# Patient Record
Sex: MALE | Race: WHITE | NOT HISPANIC OR LATINO | URBAN - METROPOLITAN AREA
[De-identification: names, ages, dates, MRNs, and addresses within clinical notes are randomized per-mention and may not be internally consistent; named-entity substitution may affect disease eponyms.]

---

## 2022-02-26 ENCOUNTER — APPOINTMENT (OUTPATIENT)
Dept: GENERAL RADIOLOGY | Facility: HOSPITAL | Age: 57
End: 2022-02-26

## 2022-02-26 ENCOUNTER — HOSPITAL ENCOUNTER (EMERGENCY)
Facility: HOSPITAL | Age: 57
Discharge: HOME | End: 2022-02-27
Attending: EMERGENCY MEDICINE | Admitting: EMERGENCY MEDICINE

## 2022-02-26 ENCOUNTER — APPOINTMENT (OUTPATIENT)
Dept: CT IMAGING | Facility: HOSPITAL | Age: 57
End: 2022-02-26

## 2022-02-26 DIAGNOSIS — Z86.39 HISTORY OF OBESITY: ICD-10-CM

## 2022-02-26 DIAGNOSIS — R07.89 ATYPICAL CHEST PAIN: Primary | ICD-10-CM

## 2022-02-26 DIAGNOSIS — Z86.79 HISTORY OF HYPERTENSION: ICD-10-CM

## 2022-02-26 DIAGNOSIS — Z86.39 HISTORY OF HYPERLIPIDEMIA: ICD-10-CM

## 2022-02-26 DIAGNOSIS — R06.02 SHORTNESS OF BREATH: ICD-10-CM

## 2022-02-26 LAB
ALBUMIN SERPL-MCNC: 4.6 G/DL (ref 3.5–5.2)
ALBUMIN/GLOB SERPL: 1.6 G/DL
ALP SERPL-CCNC: 75 U/L (ref 39–117)
ALT SERPL W P-5'-P-CCNC: 46 U/L (ref 1–41)
ANION GAP SERPL CALCULATED.3IONS-SCNC: 11 MMOL/L (ref 5–15)
AST SERPL-CCNC: 31 U/L (ref 1–40)
BASOPHILS # BLD AUTO: 0.07 10*3/MM3 (ref 0–0.2)
BASOPHILS NFR BLD AUTO: 0.8 % (ref 0–1.5)
BILIRUB SERPL-MCNC: 0.7 MG/DL (ref 0–1.2)
BUN SERPL-MCNC: 18 MG/DL (ref 6–20)
BUN/CREAT SERPL: 16.2 (ref 7–25)
CALCIUM SPEC-SCNC: 9.5 MG/DL (ref 8.6–10.5)
CHLORIDE SERPL-SCNC: 99 MMOL/L (ref 98–107)
CO2 SERPL-SCNC: 24 MMOL/L (ref 22–29)
CREAT SERPL-MCNC: 1.11 MG/DL (ref 0.76–1.27)
DEPRECATED RDW RBC AUTO: 37.7 FL (ref 37–54)
EOSINOPHIL # BLD AUTO: 0.28 10*3/MM3 (ref 0–0.4)
EOSINOPHIL NFR BLD AUTO: 3.1 % (ref 0.3–6.2)
ERYTHROCYTE [DISTWIDTH] IN BLOOD BY AUTOMATED COUNT: 11.8 % (ref 12.3–15.4)
GFR SERPL CREATININE-BSD FRML MDRD: 68 ML/MIN/1.73
GLOBULIN UR ELPH-MCNC: 2.8 GM/DL
GLUCOSE SERPL-MCNC: 109 MG/DL (ref 65–99)
HCT VFR BLD AUTO: 41.9 % (ref 37.5–51)
HGB BLD-MCNC: 14.2 G/DL (ref 13–17.7)
HOLD SPECIMEN: NORMAL
HOLD SPECIMEN: NORMAL
IMM GRANULOCYTES # BLD AUTO: 0.03 10*3/MM3 (ref 0–0.05)
IMM GRANULOCYTES NFR BLD AUTO: 0.3 % (ref 0–0.5)
LIPASE SERPL-CCNC: 47 U/L (ref 13–60)
LYMPHOCYTES # BLD AUTO: 2.41 10*3/MM3 (ref 0.7–3.1)
LYMPHOCYTES NFR BLD AUTO: 26.5 % (ref 19.6–45.3)
MCH RBC QN AUTO: 29.8 PG (ref 26.6–33)
MCHC RBC AUTO-ENTMCNC: 33.9 G/DL (ref 31.5–35.7)
MCV RBC AUTO: 87.8 FL (ref 79–97)
MONOCYTES # BLD AUTO: 0.61 10*3/MM3 (ref 0.1–0.9)
MONOCYTES NFR BLD AUTO: 6.7 % (ref 5–12)
NEUTROPHILS NFR BLD AUTO: 5.69 10*3/MM3 (ref 1.7–7)
NEUTROPHILS NFR BLD AUTO: 62.6 % (ref 42.7–76)
NRBC BLD AUTO-RTO: 0 /100 WBC (ref 0–0.2)
NT-PROBNP SERPL-MCNC: 5.4 PG/ML (ref 0–900)
PLATELET # BLD AUTO: 286 10*3/MM3 (ref 140–450)
PMV BLD AUTO: 10.6 FL (ref 6–12)
POTASSIUM SERPL-SCNC: 4.1 MMOL/L (ref 3.5–5.2)
PROT SERPL-MCNC: 7.4 G/DL (ref 6–8.5)
RBC # BLD AUTO: 4.77 10*6/MM3 (ref 4.14–5.8)
SODIUM SERPL-SCNC: 134 MMOL/L (ref 136–145)
TROPONIN T SERPL-MCNC: <0.01 NG/ML (ref 0–0.03)
TSH SERPL DL<=0.05 MIU/L-ACNC: 2.58 UIU/ML (ref 0.27–4.2)
WBC NRBC COR # BLD: 9.09 10*3/MM3 (ref 3.4–10.8)
WHOLE BLOOD HOLD SPECIMEN: NORMAL
WHOLE BLOOD HOLD SPECIMEN: NORMAL

## 2022-02-26 PROCEDURE — 70450 CT HEAD/BRAIN W/O DYE: CPT

## 2022-02-26 PROCEDURE — 81003 URINALYSIS AUTO W/O SCOPE: CPT | Performed by: PHYSICIAN ASSISTANT

## 2022-02-26 PROCEDURE — 84484 ASSAY OF TROPONIN QUANT: CPT | Performed by: EMERGENCY MEDICINE

## 2022-02-26 PROCEDURE — 83690 ASSAY OF LIPASE: CPT | Performed by: EMERGENCY MEDICINE

## 2022-02-26 PROCEDURE — 80050 GENERAL HEALTH PANEL: CPT | Performed by: EMERGENCY MEDICINE

## 2022-02-26 PROCEDURE — 83880 ASSAY OF NATRIURETIC PEPTIDE: CPT | Performed by: EMERGENCY MEDICINE

## 2022-02-26 PROCEDURE — 71045 X-RAY EXAM CHEST 1 VIEW: CPT

## 2022-02-26 PROCEDURE — 99283 EMERGENCY DEPT VISIT LOW MDM: CPT

## 2022-02-26 PROCEDURE — 93005 ELECTROCARDIOGRAM TRACING: CPT | Performed by: EMERGENCY MEDICINE

## 2022-02-26 RX ORDER — SODIUM CHLORIDE 0.9 % (FLUSH) 0.9 %
10 SYRINGE (ML) INJECTION AS NEEDED
Status: DISCONTINUED | OUTPATIENT
Start: 2022-02-26 | End: 2022-02-27 | Stop reason: HOSPADM

## 2022-02-26 RX ORDER — ASPIRIN 81 MG/1
324 TABLET, CHEWABLE ORAL ONCE
Status: DISCONTINUED | OUTPATIENT
Start: 2022-02-26 | End: 2022-02-27 | Stop reason: HOSPADM

## 2022-02-27 VITALS
DIASTOLIC BLOOD PRESSURE: 80 MMHG | WEIGHT: 250 LBS | HEIGHT: 68 IN | HEART RATE: 75 BPM | RESPIRATION RATE: 18 BRPM | SYSTOLIC BLOOD PRESSURE: 130 MMHG | TEMPERATURE: 97.8 F | OXYGEN SATURATION: 93 % | BODY MASS INDEX: 37.89 KG/M2

## 2022-02-27 LAB
BILIRUB UR QL STRIP: NEGATIVE
CLARITY UR: CLEAR
COLOR UR: YELLOW
GLUCOSE UR STRIP-MCNC: NEGATIVE MG/DL
HGB UR QL STRIP.AUTO: NEGATIVE
HOLD SPECIMEN: NORMAL
KETONES UR QL STRIP: NEGATIVE
LEUKOCYTE ESTERASE UR QL STRIP.AUTO: NEGATIVE
NITRITE UR QL STRIP: NEGATIVE
PH UR STRIP.AUTO: 6.5 [PH] (ref 5–8)
PROT UR QL STRIP: NEGATIVE
SP GR UR STRIP: <=1.005 (ref 1–1.03)
TROPONIN T SERPL-MCNC: <0.01 NG/ML (ref 0–0.03)
UROBILINOGEN UR QL STRIP: NORMAL

## 2022-03-03 LAB
QT INTERVAL: 356 MS
QT INTERVAL: 388 MS
QTC INTERVAL: 400 MS
QTC INTERVAL: 400 MS

## 2023-09-20 ENCOUNTER — TRANSCRIBE ORDERS (OUTPATIENT)
Dept: CARDIOLOGY | Facility: CLINIC | Age: 58
End: 2023-09-20

## 2023-09-20 DIAGNOSIS — R42 DIZZINESS: ICD-10-CM

## 2023-09-20 DIAGNOSIS — R06.02 SHORTNESS OF BREATH: ICD-10-CM

## 2023-09-20 DIAGNOSIS — I35.0 SEVERE AORTIC STENOSIS: Primary | ICD-10-CM

## 2023-09-20 DIAGNOSIS — R06.09 DOE (DYSPNEA ON EXERTION): ICD-10-CM

## 2023-12-11 ENCOUNTER — HOSPITAL ENCOUNTER (OUTPATIENT)
Dept: CARDIOLOGY | Facility: HOSPITAL | Age: 58
Discharge: HOME | End: 2023-12-11
Admitting: INTERNAL MEDICINE
Payer: COMMERCIAL

## 2023-12-11 VITALS
SYSTOLIC BLOOD PRESSURE: 111 MMHG | DIASTOLIC BLOOD PRESSURE: 67 MMHG | HEIGHT: 68 IN | HEART RATE: 79 BPM | RESPIRATION RATE: 14 BRPM | OXYGEN SATURATION: 93 % | BODY MASS INDEX: 37.89 KG/M2 | WEIGHT: 250 LBS

## 2023-12-11 DIAGNOSIS — I35.0 SEVERE AORTIC STENOSIS: ICD-10-CM

## 2023-12-11 DIAGNOSIS — R42 DIZZINESS: ICD-10-CM

## 2023-12-11 DIAGNOSIS — R06.02 SHORTNESS OF BREATH: ICD-10-CM

## 2023-12-11 DIAGNOSIS — R06.09 DOE (DYSPNEA ON EXERTION): ICD-10-CM

## 2023-12-11 LAB
AORTIC ROOT ANNULUS: 2.5 CM
ASCENDING AORTA: 3.1 CM
BH CV ECHO MEAS - AO MAX PG: 67 MMHG
BH CV ECHO MEAS - AO MEAN PG: 36.4 MMHG
BH CV ECHO MEAS - AO V2 MAX: 409.2 CM/SEC
BH CV ECHO MEAS - AO V2 VTI: 65.9 CM
BH CV ECHO MEAS - AVA PLANIMETRY TRACED: 0.9 CM2
BH CV ECHO MEAS - AVA(I,D): 1.21 CM2
BH CV ECHO MEAS - IVSD: 1.3 CM
BH CV ECHO MEAS - LV MAX PG: 6.4 MMHG
BH CV ECHO MEAS - LV MEAN PG: 4.4 MMHG
BH CV ECHO MEAS - LV V1 MAX: 127 CM/SEC
BH CV ECHO MEAS - LV V1 VTI: 21.6 CM
BH CV ECHO MEAS - LVOT AREA: 3.7 CM2
BH CV ECHO MEAS - LVOT DIAM: 2.17 CM
BH CV ECHO MEAS - SV(LVOT): 79.4 ML

## 2023-12-11 PROCEDURE — 93321 DOPPLER ECHO F-UP/LMTD STD: CPT | Performed by: INTERNAL MEDICINE

## 2023-12-11 PROCEDURE — 93312 ECHO TRANSESOPHAGEAL: CPT | Performed by: INTERNAL MEDICINE

## 2023-12-11 PROCEDURE — 93325 DOPPLER ECHO COLOR FLOW MAPG: CPT | Performed by: INTERNAL MEDICINE

## 2023-12-11 PROCEDURE — 99152 MOD SED SAME PHYS/QHP 5/>YRS: CPT

## 2023-12-11 PROCEDURE — 93321 DOPPLER ECHO F-UP/LMTD STD: CPT

## 2023-12-11 PROCEDURE — 25010000002 FENTANYL CITRATE (PF) 50 MCG/ML SOLUTION: Performed by: INTERNAL MEDICINE

## 2023-12-11 PROCEDURE — 99153 MOD SED SAME PHYS/QHP EA: CPT

## 2023-12-11 PROCEDURE — 93325 DOPPLER ECHO COLOR FLOW MAPG: CPT

## 2023-12-11 PROCEDURE — S0260 H&P FOR SURGERY: HCPCS

## 2023-12-11 PROCEDURE — 93312 ECHO TRANSESOPHAGEAL: CPT

## 2023-12-11 PROCEDURE — 25010000002 MIDAZOLAM PER 1 MG: Performed by: INTERNAL MEDICINE

## 2023-12-11 RX ORDER — MIDAZOLAM HYDROCHLORIDE 1 MG/ML
INJECTION INTRAMUSCULAR; INTRAVENOUS
Status: COMPLETED | OUTPATIENT
Start: 2023-12-11 | End: 2023-12-11

## 2023-12-11 RX ORDER — ATORVASTATIN CALCIUM 40 MG/1
40 TABLET, FILM COATED ORAL DAILY
COMMUNITY

## 2023-12-11 RX ORDER — LISINOPRIL 20 MG/1
20 TABLET ORAL DAILY
COMMUNITY

## 2023-12-11 RX ORDER — FENTANYL CITRATE 50 UG/ML
INJECTION, SOLUTION INTRAMUSCULAR; INTRAVENOUS
Status: COMPLETED | OUTPATIENT
Start: 2023-12-11 | End: 2023-12-11

## 2023-12-11 RX ORDER — ASPIRIN 81 MG/1
81 TABLET ORAL DAILY
COMMUNITY

## 2023-12-11 RX ADMIN — FENTANYL CITRATE 50 MCG: 50 INJECTION, SOLUTION INTRAMUSCULAR; INTRAVENOUS at 14:03

## 2023-12-11 RX ADMIN — MIDAZOLAM 2 MG: 1 INJECTION INTRAMUSCULAR; INTRAVENOUS at 14:03

## 2023-12-11 RX ADMIN — FENTANYL CITRATE 25 MCG: 50 INJECTION, SOLUTION INTRAMUSCULAR; INTRAVENOUS at 14:10

## 2023-12-11 RX ADMIN — MIDAZOLAM 2 MG: 1 INJECTION INTRAMUSCULAR; INTRAVENOUS at 14:07

## 2023-12-11 RX ADMIN — MIDAZOLAM 1 MG: 1 INJECTION INTRAMUSCULAR; INTRAVENOUS at 14:10

## 2023-12-11 RX ADMIN — MIDAZOLAM 1 MG: 1 INJECTION INTRAMUSCULAR; INTRAVENOUS at 14:29

## 2023-12-11 RX ADMIN — METHOHEXITAL SODIUM 20 MG: 500 INJECTION, POWDER, LYOPHILIZED, FOR SOLUTION INTRAMUSCULAR; INTRAVENOUS; RECTAL at 14:40

## 2023-12-11 RX ADMIN — FENTANYL CITRATE 25 MCG: 50 INJECTION, SOLUTION INTRAMUSCULAR; INTRAVENOUS at 14:25

## 2023-12-11 RX ADMIN — MIDAZOLAM 1 MG: 1 INJECTION INTRAMUSCULAR; INTRAVENOUS at 14:22

## 2023-12-11 RX ADMIN — FENTANYL CITRATE 50 MCG: 50 INJECTION, SOLUTION INTRAMUSCULAR; INTRAVENOUS at 14:07

## 2023-12-11 RX ADMIN — FENTANYL CITRATE 25 MCG: 50 INJECTION, SOLUTION INTRAMUSCULAR; INTRAVENOUS at 14:22

## 2023-12-11 RX ADMIN — METHOHEXITAL SODIUM 10 MG: 500 INJECTION, POWDER, LYOPHILIZED, FOR SOLUTION INTRAMUSCULAR; INTRAVENOUS; RECTAL at 14:36

## 2023-12-11 RX ADMIN — FENTANYL CITRATE 25 MCG: 50 INJECTION, SOLUTION INTRAMUSCULAR; INTRAVENOUS at 14:29

## 2023-12-11 RX ADMIN — MIDAZOLAM 1 MG: 1 INJECTION INTRAMUSCULAR; INTRAVENOUS at 14:25

## 2023-12-11 RX ADMIN — METHOHEXITAL SODIUM 10 MG: 500 INJECTION, POWDER, LYOPHILIZED, FOR SOLUTION INTRAMUSCULAR; INTRAVENOUS; RECTAL at 14:18

## 2023-12-11 RX ADMIN — FENTANYL CITRATE 25 MCG: 50 INJECTION, SOLUTION INTRAMUSCULAR; INTRAVENOUS at 14:34

## 2023-12-11 RX ADMIN — METHOHEXITAL SODIUM 20 MG: 500 INJECTION, POWDER, LYOPHILIZED, FOR SOLUTION INTRAMUSCULAR; INTRAVENOUS; RECTAL at 14:14

## 2023-12-11 RX ADMIN — MIDAZOLAM 1 MG: 1 INJECTION INTRAMUSCULAR; INTRAVENOUS at 14:34

## 2023-12-11 NOTE — H&P
"      [facility]   History and Physical           IDENTIFICATION: 58 y.o. male residing in [place], [place]      There are no active hospital problems to display for this patient.    Problem list:  Hypertension  Hyperlipidemia  Obesity  JHONNY, CPAP  Aortic stenosis  Systolic murmur  GONZALEZ             History of Present Illness: Freddy Schumacher is a 58 y.o. male with above pertinent medical history, who presents today for CK for further evaluation of aortic stenosis.  Patient reports that for the past year he has noticed a decrease in his activity tolerance, with exertional shortness of breath, and feeling like his \"head is swimming.\"  He denies any chest pain, pressure, tightness.  He was evaluated by his PCP, who noted a systolic murmur and he was referred to Dr. Schumacher for further evaluation.  TTE noted severe AS, with mean gradient of 36 mmHg and ELZBIETA of 1.2 cm2.  On exam, patient denies any current pain or shortness of breath.  He does have BLE pitting edema, which he was not aware of.  He denies any cardiac history aside from hypertension and hyperlipidemia, which has been managed by his PCP.  Denies any prior issues with sedation.  Patient has had no changes to his medical history or medications since he was last seen by Dr. Schumacher in September.    No Known Allergies    Prior to Admission medications    Not on File       No past medical history on file.    No past surgical history on file.    No family history on file.    Social History     Tobacco Use   Smoking Status Not on file   Smokeless Tobacco Not on file       Social History     Substance and Sexual Activity   Alcohol Use Not on file         Review of Systems:   Review of Systems   Cardiovascular:  Positive for dyspnea on exertion and leg swelling. Negative for chest pain and near-syncope.            Vital Sign Min/Max for last 24 hours  No data recorded   BP  Min: 137/88  Max: 137/88   Pulse  Min: 76  Max: 76 " "  Resp  Min: 12  Max: 12   SpO2  Min: 97 %  Max: 97 %   No data recorded    No intake or output data in the 24 hours ending 12/11/23 1400        Tele:  NSR    Vitals reviewed.   Constitutional:       General: Awake.   Pulmonary:      Effort: Pulmonary effort is normal.      Breath sounds: Normal breath sounds.   Cardiovascular:      PMI at left midclavicular line. Normal rate. Regular rhythm. Normal S1. Normal S2.       Murmurs: There is a grade 3/6 harsh midsystolic murmur at the URSB, radiating to the neck.   Pulses:     Intact distal pulses.   Edema:     Peripheral edema present.     Pretibial: bilateral pitting edema of the pretibial area.     Ankle: bilateral pitting edema of the ankle.     Feet: bilateral pitting edema of the feet.  Skin:     General: Skin is warm and dry.      Capillary Refill: Capillary refill takes less than 2 seconds.   Neurological:      Mental Status: Alert.              DATA REVIEW:    EKG (9/19/2023):    NSR, t-wave abnormality     ECHO (9/20/2023):   LVEF 60%  Aortic valve is trileaflet.  There is thickening and calcification of the aortic valve leaflets with severely reduced excursion.  Peak velocity across the aortic valve 3.9 m/s, consistent with peak gradient of 60 mmHg, mean gradient of 36 mmHg.  Calculated ELZBIETA area is 1.2 cm².  Severe aortic stenosis.      [unfilled]        No results found for: \"PHOS\", \"MG\"          No results found for: \"HGBA1C\"  No results found for: \"CHOL\", \"CHLPL\", \"TRIG\", \"HDL\", \"LDL\", \"LDLDIRECT\"         Severe aortic stenosis  Echo 9/20/2023: There is thickening and calcification of the aortic valve leaflets with severely reduced excursion.  Peak velocity across the aortic valve 3.9 m/s, consistent with peak gradient of 60 mmHg, mean gradient of 36 mmHg.  Calculated ELZBIETA area is 1.2 cm².  Severe aortic stenosis.               Proceed with transesophageal echocardiogram for further evaluation of aortic stenosis.  Procedure, risks, benefits have been " discussed and patient is agreeable to proceed.  Further recommendations to follow procedure.    LIZ Owen        Electronically signed by LIZ Owen, 12/11/23, 12:36 PM EST.

## 2024-03-28 ENCOUNTER — HOSPITAL ENCOUNTER (OUTPATIENT)
Facility: HOSPITAL | Age: 59
Setting detail: HOSPITAL OUTPATIENT SURGERY
Discharge: HOME | End: 2024-03-28
Attending: INTERNAL MEDICINE | Admitting: INTERNAL MEDICINE
Payer: COMMERCIAL

## 2024-03-28 VITALS
DIASTOLIC BLOOD PRESSURE: 79 MMHG | BODY MASS INDEX: 42.83 KG/M2 | OXYGEN SATURATION: 96 % | TEMPERATURE: 97 F | WEIGHT: 282.6 LBS | RESPIRATION RATE: 16 BRPM | HEART RATE: 72 BPM | HEIGHT: 68 IN | SYSTOLIC BLOOD PRESSURE: 114 MMHG

## 2024-03-28 DIAGNOSIS — R93.1 ABNORMAL ECHOCARDIOGRAM: Primary | ICD-10-CM

## 2024-03-28 LAB
ANION GAP SERPL CALCULATED.3IONS-SCNC: 13 MMOL/L (ref 5–15)
BUN BLDA-MCNC: 15 MG/DL (ref 8–26)
BUN SERPL-MCNC: 15 MG/DL (ref 8–23)
BUN/CREAT SERPL: 14.9 (ref 7–25)
CA-I BLDA-SCNC: 1.2 MMOL/L (ref 1.2–1.32)
CALCIUM SPEC-SCNC: 9.1 MG/DL (ref 8.6–10.5)
CHLORIDE BLDA-SCNC: 102 MMOL/L (ref 98–109)
CHLORIDE SERPL-SCNC: 101 MMOL/L (ref 98–107)
CO2 BLDA-SCNC: 26 MMOL/L (ref 24–29)
CO2 SERPL-SCNC: 25 MMOL/L (ref 22–29)
CREAT BLDA-MCNC: 1 MG/DL (ref 0.6–1.3)
CREAT SERPL-MCNC: 1.01 MG/DL (ref 0.76–1.27)
DEPRECATED RDW RBC AUTO: 39 FL (ref 37–54)
EGFRCR SERPLBLD CKD-EPI 2021: 85.1 ML/MIN/1.73
EGFRCR SERPLBLD CKD-EPI 2021: 86.2 ML/MIN/1.73
ERYTHROCYTE [DISTWIDTH] IN BLOOD BY AUTOMATED COUNT: 11.9 % (ref 12.3–15.4)
GLUCOSE BLDC GLUCOMTR-MCNC: 106 MG/DL (ref 70–130)
GLUCOSE SERPL-MCNC: 103 MG/DL (ref 65–99)
HCT VFR BLD AUTO: 41.6 % (ref 37.5–51)
HCT VFR BLDA CALC: 39 % (ref 38–51)
HGB BLD-MCNC: 13.9 G/DL (ref 13–17.7)
HGB BLDA-MCNC: 13.3 G/DL (ref 12–17)
MCH RBC QN AUTO: 30 PG (ref 26.6–33)
MCHC RBC AUTO-ENTMCNC: 33.4 G/DL (ref 31.5–35.7)
MCV RBC AUTO: 89.8 FL (ref 79–97)
PLATELET # BLD AUTO: 278 10*3/MM3 (ref 140–450)
PMV BLD AUTO: 10.7 FL (ref 6–12)
POTASSIUM BLDA-SCNC: 3.9 MMOL/L (ref 3.5–4.9)
POTASSIUM SERPL-SCNC: 4.1 MMOL/L (ref 3.5–5.2)
RBC # BLD AUTO: 4.63 10*6/MM3 (ref 4.14–5.8)
SODIUM BLD-SCNC: 141 MMOL/L (ref 138–146)
SODIUM SERPL-SCNC: 139 MMOL/L (ref 136–145)
WBC NRBC COR # BLD AUTO: 9.75 10*3/MM3 (ref 3.4–10.8)

## 2024-03-28 PROCEDURE — C1894 INTRO/SHEATH, NON-LASER: HCPCS | Performed by: INTERNAL MEDICINE

## 2024-03-28 PROCEDURE — 25010000002 HEPARIN (PORCINE) PER 1000 UNITS: Performed by: INTERNAL MEDICINE

## 2024-03-28 PROCEDURE — 93567 NJX CAR CTH SPRVLV AORTGRPHY: CPT | Performed by: INTERNAL MEDICINE

## 2024-03-28 PROCEDURE — 80048 BASIC METABOLIC PNL TOTAL CA: CPT | Performed by: INTERNAL MEDICINE

## 2024-03-28 PROCEDURE — 85027 COMPLETE CBC AUTOMATED: CPT | Performed by: INTERNAL MEDICINE

## 2024-03-28 PROCEDURE — 25010000002 NICARDIPINE 2.5 MG/ML SOLUTION: Performed by: INTERNAL MEDICINE

## 2024-03-28 PROCEDURE — C1769 GUIDE WIRE: HCPCS | Performed by: INTERNAL MEDICINE

## 2024-03-28 PROCEDURE — 25510000001 IOPAMIDOL PER 1 ML: Performed by: INTERNAL MEDICINE

## 2024-03-28 PROCEDURE — 25010000002 FENTANYL CITRATE (PF) 50 MCG/ML SOLUTION: Performed by: INTERNAL MEDICINE

## 2024-03-28 PROCEDURE — 93454 CORONARY ARTERY ANGIO S&I: CPT | Performed by: INTERNAL MEDICINE

## 2024-03-28 PROCEDURE — 25810000003 SODIUM CHLORIDE 0.9 % SOLUTION: Performed by: INTERNAL MEDICINE

## 2024-03-28 PROCEDURE — 25010000002 MIDAZOLAM PER 1 MG: Performed by: INTERNAL MEDICINE

## 2024-03-28 PROCEDURE — 25010000002 PHENYLEPHRINE 10 MG/ML SOLUTION: Performed by: INTERNAL MEDICINE

## 2024-03-28 PROCEDURE — 80047 BASIC METABLC PNL IONIZED CA: CPT

## 2024-03-28 PROCEDURE — 85014 HEMATOCRIT: CPT

## 2024-03-28 RX ORDER — ALPRAZOLAM 0.25 MG/1
0.25 TABLET ORAL 3 TIMES DAILY PRN
Status: DISCONTINUED | OUTPATIENT
Start: 2024-03-28 | End: 2024-03-28 | Stop reason: HOSPADM

## 2024-03-28 RX ORDER — HEPARIN SODIUM 1000 [USP'U]/ML
INJECTION, SOLUTION INTRAVENOUS; SUBCUTANEOUS
Status: DISCONTINUED | OUTPATIENT
Start: 2024-03-28 | End: 2024-03-28 | Stop reason: HOSPADM

## 2024-03-28 RX ORDER — NALOXONE HCL 0.4 MG/ML
0.4 VIAL (ML) INJECTION
Status: DISCONTINUED | OUTPATIENT
Start: 2024-03-28 | End: 2024-03-28 | Stop reason: HOSPADM

## 2024-03-28 RX ORDER — HYDROCODONE BITARTRATE AND ACETAMINOPHEN 5; 325 MG/1; MG/1
1 TABLET ORAL EVERY 4 HOURS PRN
Status: DISCONTINUED | OUTPATIENT
Start: 2024-03-28 | End: 2024-03-28 | Stop reason: HOSPADM

## 2024-03-28 RX ORDER — FENTANYL CITRATE 50 UG/ML
INJECTION, SOLUTION INTRAMUSCULAR; INTRAVENOUS
Status: DISCONTINUED | OUTPATIENT
Start: 2024-03-28 | End: 2024-03-28 | Stop reason: HOSPADM

## 2024-03-28 RX ORDER — SODIUM CHLORIDE 9 MG/ML
250 INJECTION, SOLUTION INTRAVENOUS CONTINUOUS
Status: ACTIVE | OUTPATIENT
Start: 2024-03-28 | End: 2024-03-28

## 2024-03-28 RX ORDER — PHENYLEPHRINE HYDROCHLORIDE 10 MG/ML
INJECTION INTRAVENOUS
Status: DISCONTINUED | OUTPATIENT
Start: 2024-03-28 | End: 2024-03-28 | Stop reason: HOSPADM

## 2024-03-28 RX ORDER — TEMAZEPAM 7.5 MG/1
7.5 CAPSULE ORAL NIGHTLY PRN
Status: DISCONTINUED | OUTPATIENT
Start: 2024-03-28 | End: 2024-03-28 | Stop reason: HOSPADM

## 2024-03-28 RX ORDER — MORPHINE SULFATE 2 MG/ML
1 INJECTION, SOLUTION INTRAMUSCULAR; INTRAVENOUS EVERY 4 HOURS PRN
Status: DISCONTINUED | OUTPATIENT
Start: 2024-03-28 | End: 2024-03-28 | Stop reason: HOSPADM

## 2024-03-28 RX ORDER — FLUTICASONE PROPIONATE 50 MCG
2 SPRAY, SUSPENSION (ML) NASAL DAILY
COMMUNITY

## 2024-03-28 RX ORDER — MIDAZOLAM HYDROCHLORIDE 1 MG/ML
INJECTION INTRAMUSCULAR; INTRAVENOUS
Status: DISCONTINUED | OUTPATIENT
Start: 2024-03-28 | End: 2024-03-28 | Stop reason: HOSPADM

## 2024-03-28 RX ORDER — LIDOCAINE HYDROCHLORIDE 10 MG/ML
INJECTION, SOLUTION EPIDURAL; INFILTRATION; INTRACAUDAL; PERINEURAL
Status: DISCONTINUED | OUTPATIENT
Start: 2024-03-28 | End: 2024-03-28 | Stop reason: HOSPADM

## 2024-03-28 RX ORDER — ACETAMINOPHEN 325 MG/1
650 TABLET ORAL EVERY 4 HOURS PRN
Status: DISCONTINUED | OUTPATIENT
Start: 2024-03-28 | End: 2024-03-28 | Stop reason: HOSPADM

## 2024-03-28 RX ORDER — NICARDIPINE HCL-0.9% SOD CHLOR 1 MG/10 ML
SYRINGE (ML) INTRAVENOUS
Status: DISCONTINUED | OUTPATIENT
Start: 2024-03-28 | End: 2024-03-28 | Stop reason: HOSPADM

## 2024-03-28 NOTE — H&P
" [facility] - History & Physical     Freddy Schumacher  1965  2504/1      03/28/24      Identification:  Freddy Schumacher is a 59 y.o. male.      PCP:  Jamal Turcios MD        Chief Complaint: Valvular heart disease/aortic stenosis    Allergies:  has No Known Allergies.    Medications Prior to Admission   Medication Sig Dispense Refill Last Dose    aspirin 81 MG EC tablet Take 1 tablet by mouth Daily.   3/27/2024    atorvastatin (LIPITOR) 40 MG tablet Take 1 tablet by mouth Daily.   3/27/2024    fluticasone (FLONASE) 50 MCG/ACT nasal spray 2 sprays into the nostril(s) as directed by provider Daily.   3/27/2024    lisinopril (PRINIVIL,ZESTRIL) 20 MG tablet Take 1 tablet by mouth Daily.   3/27/2024       No current facility-administered medications for this encounter.        HPI:  Freddy Schumacher is a 59-year-old  CM with PMH HTN, HLP, obesity, JHONNY/CPAP use who has known valvular heart disease with severe aortic stenosis.  He recently underwent echocardiography to further evaluate his valve and was in office for follow-up.  He continues to complain of SOB and lightheadedness particularly with exertion.  He denies angina, denies presyncope or syncopal episodes.  He presents today to undergo elective cardiac catheterization as part of his workup for his severe AS.          Social History     Socioeconomic History    Marital status:    Tobacco Use    Smoking status: Never   Substance and Sexual Activity    Alcohol use: Never    Drug use: Never    Sexual activity: Defer     History reviewed. No pertinent family history.  Past Surgical History:   Procedure Laterality Date    CARDIAC CATHETERIZATION      038/28/2024 per dr. becerra    KNEE MENISCAL REPAIR Right        Review of Systems:  Pertinent positives are listed above and in physical exam.  All others have been reviewed and are negative.     Objective:   Vitals:   height is 172.7 cm (68\") and weight is 128 kg (282 lb 9.6 oz). His " temporal temperature is 97 °F (36.1 °C). His blood pressure is 117/84 and his pulse is 74. His respiration is 18 and oxygen saturation is 97%.  No intake or output data in the 24 hours ending 03/28/24 0743      Physical Exam:  General Appearance:    Alert, cooperative, in no acute distress   Head:    Normocephalic, without obvious abnormality, atraumatic   Eyes:            Lids and lashes normal, conjunctivae and sclerae normal, no   icterus, no pallor, corneas clear, PERRLA. + Glasses   Ears:    Ears appear intact with no abnormalities noted   Throat:   No oral lesions, no thrush, oral mucosa moist   Neck:   No adenopathy, supple, trachea midline, no thyromegaly, no carotid bruit, no JVD   Back:     No kyphosis present, no scoliosis present, no skin lesions,   erythema or scars, no tenderness to percussion or                  palpation,  range of motion normal   Lungs:     Clear to auscultation,respirations regular, even and               unlabored    Heart:    Regular rhythm and normal rate, normal S1 and S2, no         murmur, no gallop, no rub, no click   Abdomen:     Normal bowel sounds, no masses, no organomegaly, soft     nontender, nondistended, no guarding, no rebound               tenderness.  Obese   Extremities:   Moves all extremities well,  no cyanosis, no redness, no edema   Pulses:   Pulses palpable and equal bilaterally   Skin:   No bleeding, bruising or rash   Lymph nodes:   No palpable adenopathy   Neurologic:   Cranial nerves 2 - 12 grossly intact, sensation intact, DTR    present and equal bilaterally          Results Review:  I personally reviewed the patient's clinical results.    Results from last 7 days   Lab Units 03/28/24  0648 03/28/24  0637   WBC 10*3/mm3  --  9.75   HEMOGLOBIN g/dL  --  13.9   HEMOGLOBIN, POC g/dL 13.3  --    HEMATOCRIT %  --  41.6   HEMATOCRIT POC % 39  --    PLATELETS 10*3/mm3  --  278     Results from last 7 days   Lab Units 03/28/24  0648 03/28/24  0637   SODIUM  mmol/L  --  139   POTASSIUM mmol/L  --  4.1   CHLORIDE mmol/L  --  101   CO2 mmol/L  --  25.0   BUN mg/dL  --  15   CREATININE mg/dL 1.00 1.01   CALCIUM mg/dL  --  9.1   GLUCOSE mg/dL  --  103*     Results from last 7 days   Lab Units 03/28/24  0648 03/28/24  0637   SODIUM mmol/L  --  139   POTASSIUM mmol/L  --  4.1   CHLORIDE mmol/L  --  101   CO2 mmol/L  --  25.0   BUN mg/dL  --  15   CREATININE mg/dL 1.00 1.01   GLUCOSE mg/dL  --  103*   CALCIUM mg/dL  --  9.1                       Radiology:  Imaging Results (Last 72 Hours)       ** No results found for the last 72 hours. **            Tele: NSR    Assessment:  -59-year-old CM with VHDz /severe AS by CK December 2023 , symptomatic with exertional dyspnea and lightheadedness.  -HTN  -HLP  -JHONNY/CPAP compliant  -Obesity        Plan:  -Presents today to undergo elective cardiac catheterization as part of TAVR workup.  Risk and benefits have been reviewed with the patient and his wife and he is willing to proceed.  Further recommendations based on results.  -Continue current home medications.  -Continue CPAP use  - weight loss    I discussed the patient's findings and my recommendations with the patient, any present family members, and the nursing staff.  Donny Schumacher MD saw and examined patient, verified hx and PE, read all radiographic studies, reviewed labs and micro data, and formulated dx, plan for treatment and all medical decision making.      Naida Jensen PA-C for Donny Schumacher MD  03/28/24 07:43 EDT

## 2024-03-29 ENCOUNTER — DOCUMENTATION (OUTPATIENT)
Dept: CARDIOLOGY | Facility: HOSPITAL | Age: 59
End: 2024-03-29
Payer: COMMERCIAL

## 2024-03-29 NOTE — PROGRESS NOTES
Referral received from Dr. Schumacher for severe aortic stenosis. We discussed AS, SAVR vs. TAVR, pre-procedural testing requirements, hospitalization and recovery expectations. He will need TAVR CT and an apt with Dr. Joshi, we will arrange these apts and call him with the information. Educational folder mailed today, my contact information is included.     Will follow up after CT and CT surgery consult.

## 2024-04-02 DIAGNOSIS — I35.0 SEVERE AORTIC STENOSIS: Primary | ICD-10-CM

## 2024-04-05 ENCOUNTER — HOSPITAL ENCOUNTER (OUTPATIENT)
Dept: CT IMAGING | Facility: HOSPITAL | Age: 59
Discharge: HOME | End: 2024-04-05
Payer: COMMERCIAL

## 2024-04-05 VITALS
WEIGHT: 275 LBS | SYSTOLIC BLOOD PRESSURE: 103 MMHG | HEIGHT: 68 IN | BODY MASS INDEX: 41.68 KG/M2 | RESPIRATION RATE: 18 BRPM | TEMPERATURE: 97.7 F | DIASTOLIC BLOOD PRESSURE: 64 MMHG | HEART RATE: 75 BPM | OXYGEN SATURATION: 94 %

## 2024-04-05 DIAGNOSIS — I35.0 SEVERE AORTIC STENOSIS: ICD-10-CM

## 2024-04-05 PROCEDURE — 74174 CTA ABD&PLVS W/CONTRAST: CPT

## 2024-04-05 PROCEDURE — 25510000001 IOPAMIDOL PER 1 ML: Performed by: INTERNAL MEDICINE

## 2024-04-05 PROCEDURE — 71275 CT ANGIOGRAPHY CHEST: CPT

## 2024-04-05 RX ORDER — CETIRIZINE HYDROCHLORIDE 10 MG/1
10 TABLET ORAL DAILY
COMMUNITY

## 2024-04-05 RX ORDER — SODIUM CHLORIDE 0.9 % (FLUSH) 0.9 %
10 SYRINGE (ML) INJECTION AS NEEDED
Status: DISCONTINUED | OUTPATIENT
Start: 2024-04-05 | End: 2024-04-06 | Stop reason: HOSPADM

## 2024-04-05 RX ORDER — SODIUM CHLORIDE 9 MG/ML
40 INJECTION, SOLUTION INTRAVENOUS AS NEEDED
Status: DISCONTINUED | OUTPATIENT
Start: 2024-04-05 | End: 2024-04-06 | Stop reason: HOSPADM

## 2024-04-05 RX ORDER — SODIUM CHLORIDE 0.9 % (FLUSH) 0.9 %
10 SYRINGE (ML) INJECTION EVERY 12 HOURS SCHEDULED
Status: DISCONTINUED | OUTPATIENT
Start: 2024-04-05 | End: 2024-04-06 | Stop reason: HOSPADM

## 2024-04-05 RX ORDER — METOPROLOL TARTRATE 50 MG/1
50 TABLET, FILM COATED ORAL ONCE AS NEEDED
Status: DISCONTINUED | OUTPATIENT
Start: 2024-04-05 | End: 2024-04-06 | Stop reason: HOSPADM

## 2024-04-05 RX ORDER — METOPROLOL TARTRATE 100 MG/1
100 TABLET ORAL ONCE AS NEEDED
Status: DISCONTINUED | OUTPATIENT
Start: 2024-04-05 | End: 2024-04-06 | Stop reason: HOSPADM

## 2024-04-05 RX ADMIN — IOPAMIDOL 100 ML: 755 INJECTION, SOLUTION INTRAVENOUS at 14:00

## 2024-04-08 NOTE — PROGRESS NOTES
All pre-TAVR testing complete and consult with Dr. Joshi is scheduled for 5/20. If imaging is satisfactory and all are in agreement, will schedule TAVR. Date TBD.

## 2024-05-20 ENCOUNTER — OFFICE VISIT (OUTPATIENT)
Dept: CARDIAC SURGERY | Facility: CLINIC | Age: 59
End: 2024-05-20
Payer: COMMERCIAL

## 2024-05-20 ENCOUNTER — DOCUMENTATION (OUTPATIENT)
Dept: CARDIOLOGY | Facility: HOSPITAL | Age: 59
End: 2024-05-20
Payer: COMMERCIAL

## 2024-05-20 VITALS
WEIGHT: 282.6 LBS | DIASTOLIC BLOOD PRESSURE: 78 MMHG | TEMPERATURE: 98.6 F | OXYGEN SATURATION: 98 % | SYSTOLIC BLOOD PRESSURE: 138 MMHG | HEART RATE: 89 BPM | HEIGHT: 68 IN | BODY MASS INDEX: 42.83 KG/M2

## 2024-05-20 DIAGNOSIS — I35.9 AORTIC VALVE DISORDER: Primary | ICD-10-CM

## 2024-05-20 PROCEDURE — 99204 OFFICE O/P NEW MOD 45 MIN: CPT | Performed by: THORACIC SURGERY (CARDIOTHORACIC VASCULAR SURGERY)

## 2024-05-20 NOTE — PROGRESS NOTES
Met with patient and spouse after apt with Dr. Joshi. He reports worsening GONZALEZ and fatigue. Anticipating TAVR to be able to do outdoor activities this summer like kayaking and hiking. We discussed aortic stenosis, SAVR vs. TAVR, hospitalization and recovery expectations. Goals of care were discussed with the patient. A shared decision making approach was used as we discussed the patient's treatment options for severe symptomatic aortic stenosis.  Treatment options include TAVR, SAVR and medical management.  Risks and benefits of transcatheter aortic valve replacement (TAVR) versus surgical aortic valve replacement (SAVR) were discussed. Risks include, but are not limited to, death, stroke, bleeding, vascular injury, heart rhythm disturbance (including possible need for permanent pacemaker), infection, heart attack, kidney failure and other organ failure.     Will follow up after TAVR. CT surgery will call him when TAVR is scheduled.     NYHA II  KCCQ 36/70  5MWT 6.21s/5 meters

## 2024-05-20 NOTE — PROGRESS NOTES
05/20/2024  Patient Information  Freddy Schumacher                                                                                          [address] [place] [id number]   1965  'PCP/Referring Physician'  Jamal Turcios MD  [phone]  Alejandrina Jackman, APRN  [phone]  Chief Complaint   Patient presents with    Consult     N/p per Alejandrina Jackman for TAVR consult. Pt states that he is SOB with exertion like when walking fast other than that he feels fine.        History of Present Illness:  The patient is a 59-year-old male who has been referred for evaluation of SAVR versus TAVR.  He has apparently met with Dr. Schumacher and discussed this and has chosen a TAVR.  He has been getting very short of breath with activity particularly going up inclines or stairs.  He is here for evaluation. He does have obstructive sleep apnea and is on a CPAP.      There is no problem list on file for this patient.    Past Medical History:   Diagnosis Date    Aortic stenosis     Heart murmur     Hyperlipidemia     Hypertension     JHONNY on CPAP      Past Surgical History:   Procedure Laterality Date    CARDIAC CATHETERIZATION      038/28/2024 per dr. schumacher    CARDIAC CATHETERIZATION N/A 3/28/2024    Procedure: Left Heart Cath;  Surgeon: Donny Schumacher MD;  Location: [facility] CATH INVASIVE LOCATION;  Service: Cardiology;  Laterality: N/A;    KNEE MENISCAL REPAIR Right        Current Outpatient Medications:     aspirin 81 MG EC tablet, Take 1 tablet by mouth Daily., Disp: , Rfl:     atorvastatin (LIPITOR) 40 MG tablet, Take 1 tablet by mouth Daily., Disp: , Rfl:     cetirizine (zyrTEC) 10 MG tablet, Take 1 tablet by mouth Daily., Disp: , Rfl:     fluticasone (FLONASE) 50 MCG/ACT nasal spray, 2 sprays into the nostril(s) as directed by provider Daily., Disp: , Rfl:     lisinopril (PRINIVIL,ZESTRIL) 20 MG tablet, Take 1 tablet by mouth Daily., Disp: , Rfl:   No Known Allergies  Social History     Socioeconomic  "History    Marital status:     Number of children: 3   Tobacco Use    Smoking status: Never   Vaping Use    Vaping status: Never Used   Substance and Sexual Activity    Alcohol use: Never    Drug use: Never    Sexual activity: Defer     Family History   Problem Relation Age of Onset    Diabetes Mother     Coronary artery disease Father      Review of Systems   Constitutional: Negative for chills, decreased appetite, fever, malaise/fatigue, weight gain and weight loss.   HENT:  Negative for hearing loss.    Cardiovascular:  Positive for dyspnea on exertion (with great exertion). Negative for chest pain, claudication, cyanosis, irregular heartbeat, leg swelling, near-syncope, orthopnea, palpitations, paroxysmal nocturnal dyspnea and syncope.   Endocrine: Negative for polydipsia, polyphagia and polyuria.   Hematologic/Lymphatic: Negative for adenopathy. Does not bruise/bleed easily.   Musculoskeletal:  Negative for arthritis, falls, gout, joint pain, joint swelling, muscle weakness and myalgias.   Gastrointestinal:  Negative for abdominal pain, anorexia, dysphagia, heartburn, nausea and vomiting.   Genitourinary:  Negative for dysuria and hematuria.   Neurological:  Positive for numbness (in the fingers of both hands). Negative for dizziness, focal weakness, headaches, loss of balance, seizures, vertigo and weakness.   Psychiatric/Behavioral:  Negative for altered mental status, depression, substance abuse and suicidal ideas. The patient is not nervous/anxious.    Allergic/Immunologic: Positive for environmental allergies. Negative for HIV exposure and persistent infections.       Vitals:    05/20/24 0844   BP: 138/78   Pulse: 89   Temp: 98.6 °F (37 °C)   SpO2: 98%   Weight: 128 kg (282 lb 9.6 oz)   Height: 172.7 cm (68\")      Physical Exam  Vitals and nursing note reviewed.   Constitutional:       General: He is not in acute distress.     Appearance: He is well-developed.   HENT:      Head: Normocephalic. "   Eyes:      Conjunctiva/sclera: Conjunctivae normal.      Pupils: Pupils are equal, round, and reactive to light.   Neck:      Thyroid: No thyroid mass or thyromegaly.   Cardiovascular:      Rate and Rhythm: Normal rate.      Heart sounds: Murmur heard.      No friction rub. No gallop.   Pulmonary:      Breath sounds: No wheezing, rhonchi or rales.   Abdominal:      General: Bowel sounds are normal. There is no distension.      Palpations: Abdomen is soft. There is no mass.      Tenderness: There is no abdominal tenderness.   Musculoskeletal:         General: No deformity. Normal range of motion.      Cervical back: Normal range of motion.   Skin:     Findings: No petechiae.      Nails: There is no clubbing.   Neurological:      Mental Status: He is oriented to person, place, and time.      Cranial Nerves: No cranial nerve deficit.      Sensory: No sensory deficit.   Psychiatric:         Behavior: Behavior normal.         The ROS, past medical history, surgical history, family history, social history, and vitals were reviewed by myself and corrected as needed.      Labs/Imaging:  I have obtained and reviewed the medical records from Ms. Jackman including the echocardiogram demonstrating severe aortic stenosis.     Assessment/Plan:  The patient is a 59-year-old male who presents at this time with severe aortic stenosis with a mean gradient of 36 V-max of 409.  He is very symptomatic with shortness of breath with minimal activities particularly going up stairs.  I discussed with him SAVR versus TAVR and the advantages and cons of each approach.  He is certainly leaning very much toward TAVR.  He does not want to go through the surgery aspect associated with this TAVR.  He is aware of the disadvantages with TAVR as well.  He still has chosen TAVR.  I discussed the operation, risks, and alternatives.  I discussed the risks, which includes, risk to his life, bleeding, infection and organ failure, as well as other risk  factors.  He appears to be fully informed and desires to proceed. I would like to thank you for this consultation.    There is no problem list on file for this patient.    CC: MD Alejandrina Perez APRN Regina Fugate editing for Surjit Joshi MD       I, Surjit Joshi MD, have read and agree with the editing done by Nicki Angel, .

## 2024-05-24 ENCOUNTER — PATIENT ROUNDING (BHMG ONLY) (OUTPATIENT)
Dept: CARDIAC SURGERY | Facility: CLINIC | Age: 59
End: 2024-05-24
Payer: COMMERCIAL

## 2024-05-24 DIAGNOSIS — I35.9 AORTIC VALVE DISORDER: Primary | ICD-10-CM

## 2024-05-24 NOTE — PROGRESS NOTES
May 24, 2024    Hello, may I speak with Freddy Schumacher?    My name is Amada    I am  with [facility] CARDIOTHORACIC SURGERY  [address]  [facility] [address] [phone].    Before we get started may I verify your date of birth? 1965    I am calling to officially welcome you to our practice and ask about your recent visit. Is this a good time to talk? YES    Tell me about your visit with us. What things went well?  YES       We're always looking for ways to make our patients' experiences even better. Do you have recommendations on ways we may improve?  NO    Overall were you satisfied with your first visit to our practice? YES VERY SATISFIED       I appreciate you taking the time to speak with me today. Is there anything else I can do for you? NO      Thank you, and have a great day.

## 2024-06-06 ENCOUNTER — PREP FOR SURGERY (OUTPATIENT)
Dept: OTHER | Facility: HOSPITAL | Age: 59
End: 2024-06-06
Payer: COMMERCIAL

## 2024-06-06 DIAGNOSIS — I35.9 AORTIC VALVE DISORDER: Primary | ICD-10-CM

## 2024-06-07 RX ORDER — CHLORHEXIDINE GLUCONATE 500 MG/1
CLOTH TOPICAL EVERY 12 HOURS PRN
OUTPATIENT
Start: 2024-06-07

## 2024-06-07 RX ORDER — CHLORHEXIDINE GLUCONATE 500 MG/1
1 CLOTH TOPICAL EVERY 12 HOURS PRN
OUTPATIENT
Start: 2024-06-07

## 2024-06-07 RX ORDER — ASPIRIN 325 MG
325 TABLET ORAL NIGHTLY
OUTPATIENT
Start: 2024-06-07 | End: 2024-06-08

## 2024-06-07 RX ORDER — NITROGLYCERIN 0.4 MG/1
0.4 TABLET SUBLINGUAL
OUTPATIENT
Start: 2024-06-07

## 2024-06-07 RX ORDER — CHLORHEXIDINE GLUCONATE ORAL RINSE 1.2 MG/ML
15 SOLUTION DENTAL ONCE
OUTPATIENT
Start: 2024-06-07 | End: 2024-06-07

## 2024-06-19 ENCOUNTER — HOSPITAL ENCOUNTER (OUTPATIENT)
Dept: GENERAL RADIOLOGY | Facility: HOSPITAL | Age: 59
Discharge: HOME | End: 2024-06-19
Payer: COMMERCIAL

## 2024-06-19 ENCOUNTER — PRE-ADMISSION TESTING (OUTPATIENT)
Dept: PREADMISSION TESTING | Facility: HOSPITAL | Age: 59
End: 2024-06-19
Payer: COMMERCIAL

## 2024-06-19 ENCOUNTER — HOSPITAL ENCOUNTER (OUTPATIENT)
Dept: PULMONOLOGY | Facility: HOSPITAL | Age: 59
Discharge: HOME | End: 2024-06-19
Payer: COMMERCIAL

## 2024-06-19 VITALS — WEIGHT: 279.54 LBS | BODY MASS INDEX: 42.37 KG/M2 | HEIGHT: 68 IN | OXYGEN SATURATION: 96 %

## 2024-06-19 DIAGNOSIS — Z01.89 LABORATORY TEST: Primary | ICD-10-CM

## 2024-06-19 DIAGNOSIS — I35.9 AORTIC VALVE DISORDER: ICD-10-CM

## 2024-06-19 LAB
ABO GROUP BLD: NORMAL
ALBUMIN SERPL-MCNC: 4 G/DL (ref 3.5–5.2)
ALBUMIN/GLOB SERPL: 1.3 G/DL
ALP SERPL-CCNC: 85 U/L (ref 39–117)
ALT SERPL W P-5'-P-CCNC: 49 U/L (ref 1–41)
AMPHET+METHAMPHET UR QL: NEGATIVE
AMPHETAMINES UR QL: NEGATIVE
ANION GAP SERPL CALCULATED.3IONS-SCNC: 11 MMOL/L (ref 5–15)
APTT PPP: 29.3 SECONDS (ref 22–39)
AST SERPL-CCNC: 36 U/L (ref 1–40)
BARBITURATES UR QL SCN: NEGATIVE
BASOPHILS # BLD AUTO: 0.07 10*3/MM3 (ref 0–0.2)
BASOPHILS NFR BLD AUTO: 0.8 % (ref 0–1.5)
BENZODIAZ UR QL SCN: NEGATIVE
BILIRUB SERPL-MCNC: 0.7 MG/DL (ref 0–1.2)
BUN SERPL-MCNC: 12 MG/DL (ref 8–23)
BUN/CREAT SERPL: 12.1 (ref 7–25)
BUPRENORPHINE SERPL-MCNC: NEGATIVE NG/ML
CALCIUM SPEC-SCNC: 8.8 MG/DL (ref 8.6–10.5)
CANNABINOIDS SERPL QL: NEGATIVE
CHLORIDE SERPL-SCNC: 105 MMOL/L (ref 98–107)
CO2 SERPL-SCNC: 23 MMOL/L (ref 22–29)
COCAINE UR QL: NEGATIVE
CREAT SERPL-MCNC: 0.99 MG/DL (ref 0.76–1.27)
DEPRECATED RDW RBC AUTO: 39 FL (ref 37–54)
EGFRCR SERPLBLD CKD-EPI 2021: 87.2 ML/MIN/1.73
EOSINOPHIL # BLD AUTO: 0.44 10*3/MM3 (ref 0–0.4)
EOSINOPHIL NFR BLD AUTO: 5.3 % (ref 0.3–6.2)
ERYTHROCYTE [DISTWIDTH] IN BLOOD BY AUTOMATED COUNT: 11.9 % (ref 12.3–15.4)
FENTANYL UR-MCNC: NEGATIVE NG/ML
GLOBULIN UR ELPH-MCNC: 3 GM/DL
GLUCOSE SERPL-MCNC: 116 MG/DL (ref 65–99)
HBA1C MFR BLD: 6 % (ref 4.8–5.6)
HCT VFR BLD AUTO: 40.1 % (ref 37.5–51)
HGB BLD-MCNC: 13.6 G/DL (ref 13–17.7)
IMM GRANULOCYTES # BLD AUTO: 0.03 10*3/MM3 (ref 0–0.05)
IMM GRANULOCYTES NFR BLD AUTO: 0.4 % (ref 0–0.5)
INR PPP: 1 (ref 0.89–1.12)
LYMPHOCYTES # BLD AUTO: 1.81 10*3/MM3 (ref 0.7–3.1)
LYMPHOCYTES NFR BLD AUTO: 21.7 % (ref 19.6–45.3)
MAGNESIUM SERPL-MCNC: 2 MG/DL (ref 1.6–2.4)
MCH RBC QN AUTO: 30.4 PG (ref 26.6–33)
MCHC RBC AUTO-ENTMCNC: 33.9 G/DL (ref 31.5–35.7)
MCV RBC AUTO: 89.7 FL (ref 79–97)
METHADONE UR QL SCN: NEGATIVE
MONOCYTES # BLD AUTO: 0.71 10*3/MM3 (ref 0.1–0.9)
MONOCYTES NFR BLD AUTO: 8.5 % (ref 5–12)
NEUTROPHILS NFR BLD AUTO: 5.29 10*3/MM3 (ref 1.7–7)
NEUTROPHILS NFR BLD AUTO: 63.3 % (ref 42.7–76)
NRBC BLD AUTO-RTO: 0 /100 WBC (ref 0–0.2)
OPIATES UR QL: NEGATIVE
OXYCODONE UR QL SCN: NEGATIVE
PA ADP PRP-ACNC: 213 PRU
PCP UR QL SCN: NEGATIVE
PLATELET # BLD AUTO: 253 10*3/MM3 (ref 140–450)
PMV BLD AUTO: 10.5 FL (ref 6–12)
POTASSIUM SERPL-SCNC: 4.2 MMOL/L (ref 3.5–5.2)
PROT SERPL-MCNC: 7 G/DL (ref 6–8.5)
PROTHROMBIN TIME: 13.3 SECONDS (ref 12.2–14.5)
QT INTERVAL: 366 MS
QTC INTERVAL: 408 MS
RBC # BLD AUTO: 4.47 10*6/MM3 (ref 4.14–5.8)
RH BLD: POSITIVE
SODIUM SERPL-SCNC: 139 MMOL/L (ref 136–145)
TRICYCLICS UR QL SCN: NEGATIVE
WBC NRBC COR # BLD AUTO: 8.35 10*3/MM3 (ref 3.4–10.8)

## 2024-06-19 PROCEDURE — 86900 BLOOD TYPING SEROLOGIC ABO: CPT

## 2024-06-19 PROCEDURE — 83735 ASSAY OF MAGNESIUM: CPT

## 2024-06-19 PROCEDURE — 85576 BLOOD PLATELET AGGREGATION: CPT

## 2024-06-19 PROCEDURE — 94010 BREATHING CAPACITY TEST: CPT

## 2024-06-19 PROCEDURE — 36415 COLL VENOUS BLD VENIPUNCTURE: CPT

## 2024-06-19 PROCEDURE — 80053 COMPREHEN METABOLIC PANEL: CPT

## 2024-06-19 PROCEDURE — 86901 BLOOD TYPING SEROLOGIC RH(D): CPT

## 2024-06-19 PROCEDURE — 85730 THROMBOPLASTIN TIME PARTIAL: CPT

## 2024-06-19 PROCEDURE — 71046 X-RAY EXAM CHEST 2 VIEWS: CPT

## 2024-06-19 PROCEDURE — 85025 COMPLETE CBC W/AUTO DIFF WBC: CPT

## 2024-06-19 PROCEDURE — 93005 ELECTROCARDIOGRAM TRACING: CPT

## 2024-06-19 PROCEDURE — 85610 PROTHROMBIN TIME: CPT

## 2024-06-19 PROCEDURE — 80307 DRUG TEST PRSMV CHEM ANLYZR: CPT

## 2024-06-19 PROCEDURE — 93010 ELECTROCARDIOGRAM REPORT: CPT | Performed by: INTERNAL MEDICINE

## 2024-06-19 PROCEDURE — 83036 HEMOGLOBIN GLYCOSYLATED A1C: CPT

## 2024-06-19 RX ORDER — CHLORHEXIDINE GLUCONATE 500 MG/1
1 CLOTH TOPICAL EVERY 12 HOURS PRN
Status: ACTIVE | OUTPATIENT
Start: 2024-06-19

## 2024-06-19 RX ORDER — ASPIRIN 325 MG
325 TABLET ORAL NIGHTLY
Status: SHIPPED | OUTPATIENT
Start: 2024-06-19 | End: 2024-06-20

## 2024-06-19 RX ORDER — OXYMETAZOLINE HYDROCHLORIDE 0.05 G/100ML
2 SPRAY NASAL AS NEEDED
COMMUNITY

## 2024-06-19 NOTE — PAT
An arrival time for procedure was not provided during PAT visit. If patient had any questions or concerns about their arrival time, they were instructed to contact their surgeon/physician.  Additionally, if the patient referred to an arrival time that was acquired from their my chart account, patient was encouraged to verify that time with their surgeon/physician. Arrival times are NOT provided in Pre Admission Testing Department.    Instructed patient to take 325 mg of Asprin (or four tablets of the 81 mg strength) the night before heart surgery as per CT surgeon's order.  Patient and/or family verbalized understanding.    Bactroban to be applied to each nostril during PAT visit if surgery the following day.  If surgery NOT the following day, then Bactroban supplied to patient with instructions both written and verbally to insert Bactroban into each nares the night before surgery.    Patient to apply Chlorhexadine wipes  to surgical area (as instructed) the night before procedure and the AM of procedure. Wipes provided.    Patient instructed to drink 20 ounces of Gatorade or Gatorlyte (if diabetic) and it needs to be completed 1 hour (for Main OR patients) or 2 hours (scheduled  section & [facility] patients) before given arrival time for procedure (NO RED Gatorade and NO Gatorade Zero).    Patient verbalized understanding.    One-on-one Pre Admission Testing general education provided during PAT visit.  Copies of PAT general education handouts (Incentive Spirometry, Meds to Beds Program, Patient Belongings, Pre-op skin preparation instructions, Blood Glucose testing, Visitor policy, Surgery FAQ, Code H) distributed to patient. Encouraged patient/family to read PAT general education handouts thoroughly and notify PAT staff with any questions or concerns. Patient instructed to bring PAT pass and completed skin prep sheet (if applicable) on the day of procedure. Patient verbalized understanding of all information  and priority content.     Too early to draw type and screen in PAT.  Please obtain blood bank specimen in pre-op on the day of surgery.    Patient directed to Radiology Department for CXR and Respiratory Care Department for PFT after Pre-Admission Testing appointment.

## 2024-06-26 ENCOUNTER — ANESTHESIA EVENT (OUTPATIENT)
Dept: PERIOP | Facility: HOSPITAL | Age: 59
End: 2024-06-26
Payer: COMMERCIAL

## 2024-06-26 RX ORDER — FAMOTIDINE 10 MG/ML
20 INJECTION, SOLUTION INTRAVENOUS ONCE
Status: CANCELLED | OUTPATIENT
Start: 2024-06-26 | End: 2024-06-26

## 2024-06-26 NOTE — ANESTHESIA PREPROCEDURE EVALUATION
Anesthesia Evaluation     Patient summary reviewed and Nursing notes reviewed   no history of anesthetic complications:   NPO Solid Status: > 8 hours  NPO Liquid Status: > 2 hours           Airway   Mallampati: IV  TM distance: >3 FB  Neck ROM: full  Difficult intubation highly probable and Large neck circumference  Dental      Pulmonary     breath sounds clear to auscultation  (+) ,sleep apnea  Cardiovascular   Exercise tolerance: poor (<4 METS)    ECG reviewed  Rhythm: regular  Rate: normal    (+) hypertension, valvular problems/murmurs AS, GONZALEZ, murmur, hyperlipidemia    ROS comment: 3/24- no signif coronary disease; no dissection or dilatation; no signif AI     23-simba - lvef 61 - 65%.   mild concentric hypertrophy.   aortic valve appears trileaflet. Mild aortic valve regurgitation is present. Severe aortic valve stenosis is present.  ·  ELZBIETA(I,D) 1.21 cm2 ELZBIETA (traced) 0.9 cm2. Ao pk barbara 409.2 cm/sec. Ao max PG 67 mmHg Ao mean PG 36.4 mmHg         Neuro/Psych  GI/Hepatic/Renal/Endo      Musculoskeletal     Abdominal    Substance History      OB/GYN          Other        ROS/Med Hx Other: Hgb 13.6 k 4.2                   Anesthesia Plan    ASA 4     general and Sonya     (SIMBA)  intravenous induction     Anesthetic plan, risks, benefits, and alternatives have been provided, discussed and informed consent has been obtained with: patient and spouse/significant other.    Plan discussed with CRNA.    CODE STATUS:

## 2024-06-27 ENCOUNTER — HOSPITAL ENCOUNTER (INPATIENT)
Facility: HOSPITAL | Age: 59
LOS: 1 days | Discharge: HOME | DRG: 267 | End: 2024-06-28
Attending: THORACIC SURGERY (CARDIOTHORACIC VASCULAR SURGERY) | Admitting: THORACIC SURGERY (CARDIOTHORACIC VASCULAR SURGERY)
Payer: COMMERCIAL

## 2024-06-27 ENCOUNTER — TRANSCRIBE ORDERS (OUTPATIENT)
Dept: CARDIAC REHAB | Facility: HOSPITAL | Age: 59
End: 2024-06-27
Payer: COMMERCIAL

## 2024-06-27 ENCOUNTER — ANESTHESIA (OUTPATIENT)
Dept: PERIOP | Facility: HOSPITAL | Age: 59
End: 2024-06-27
Payer: COMMERCIAL

## 2024-06-27 ENCOUNTER — ANESTHESIA EVENT CONVERTED (OUTPATIENT)
Dept: ANESTHESIOLOGY | Facility: HOSPITAL | Age: 59
DRG: 267 | End: 2024-06-27
Payer: COMMERCIAL

## 2024-06-27 ENCOUNTER — ANCILLARY PROCEDURE (OUTPATIENT)
Dept: PERIOP | Facility: HOSPITAL | Age: 59
DRG: 267 | End: 2024-06-27
Payer: COMMERCIAL

## 2024-06-27 DIAGNOSIS — I35.0 SEVERE AORTIC VALVE STENOSIS: Primary | ICD-10-CM

## 2024-06-27 DIAGNOSIS — Z95.2 S/P TAVR (TRANSCATHETER AORTIC VALVE REPLACEMENT): Primary | ICD-10-CM

## 2024-06-27 DIAGNOSIS — I35.9 AORTIC VALVE DISORDER: ICD-10-CM

## 2024-06-27 DIAGNOSIS — I35.9 AORTIC VALVE DISORDER: Primary | ICD-10-CM

## 2024-06-27 DIAGNOSIS — I35.0 AORTIC STENOSIS, SEVERE: ICD-10-CM

## 2024-06-27 DIAGNOSIS — Z95.3 S/P TAVR (TRANSCATHETER AORTIC VALVE REPLACEMENT), BIOPROSTHETIC: ICD-10-CM

## 2024-06-27 LAB
ABO GROUP BLD: NORMAL
ACT BLD: 128 SECONDS (ref 82–152)
ANION GAP SERPL CALCULATED.3IONS-SCNC: 11 MMOL/L (ref 5–15)
APTT PPP: 32.9 SECONDS (ref 22–39)
BASE EXCESS BLDA CALC-SCNC: -2 MMOL/L (ref -5–5)
BLD GP AB SCN SERPL QL: NEGATIVE
BUN SERPL-MCNC: 16 MG/DL (ref 8–23)
BUN/CREAT SERPL: 18.4 (ref 7–25)
CA-I BLDA-SCNC: 1.26 MMOL/L (ref 1.2–1.32)
CALCIUM SPEC-SCNC: 8.1 MG/DL (ref 8.6–10.5)
CHLORIDE SERPL-SCNC: 103 MMOL/L (ref 98–107)
CO2 BLDA-SCNC: 24 MMOL/L (ref 24–29)
CO2 SERPL-SCNC: 22 MMOL/L (ref 22–29)
CREAT SERPL-MCNC: 0.87 MG/DL (ref 0.76–1.27)
DEPRECATED RDW RBC AUTO: 40.6 FL (ref 37–54)
EGFRCR SERPLBLD CKD-EPI 2021: 98.8 ML/MIN/1.73
ERYTHROCYTE [DISTWIDTH] IN BLOOD BY AUTOMATED COUNT: 12 % (ref 12.3–15.4)
GLUCOSE BLDC GLUCOMTR-MCNC: 114 MG/DL (ref 70–130)
GLUCOSE BLDC GLUCOMTR-MCNC: 114 MG/DL (ref 70–130)
GLUCOSE SERPL-MCNC: 128 MG/DL (ref 65–99)
HCO3 BLDA-SCNC: 22.7 MMOL/L (ref 22–26)
HCT VFR BLD AUTO: 39.9 % (ref 37.5–51)
HCT VFR BLDA CALC: 37 % (ref 38–51)
HGB BLD-MCNC: 13.4 G/DL (ref 13–17.7)
HGB BLDA-MCNC: 12.6 G/DL (ref 12–17)
MCH RBC QN AUTO: 30.9 PG (ref 26.6–33)
MCHC RBC AUTO-ENTMCNC: 33.6 G/DL (ref 31.5–35.7)
MCV RBC AUTO: 92.1 FL (ref 79–97)
PCO2 BLDA: 33.9 MM HG (ref 35–45)
PH BLDA: 7.43 PH UNITS (ref 7.35–7.6)
PLATELET # BLD AUTO: 238 10*3/MM3 (ref 140–450)
PMV BLD AUTO: 10.7 FL (ref 6–12)
PO2 BLDA: 84 MMHG (ref 80–105)
POTASSIUM BLDA-SCNC: 3.7 MMOL/L (ref 3.5–4.9)
POTASSIUM SERPL-SCNC: 4.6 MMOL/L (ref 3.5–5.2)
QT INTERVAL: 372 MS
QTC INTERVAL: 424 MS
RBC # BLD AUTO: 4.33 10*6/MM3 (ref 4.14–5.8)
RH BLD: POSITIVE
SAO2 % BLDA: 97 % (ref 95–98)
SODIUM BLD-SCNC: 138 MMOL/L (ref 138–146)
SODIUM SERPL-SCNC: 136 MMOL/L (ref 136–145)
T&S EXPIRATION DATE: NORMAL
WBC NRBC COR # BLD AUTO: 11.27 10*3/MM3 (ref 3.4–10.8)

## 2024-06-27 PROCEDURE — 93005 ELECTROCARDIOGRAM TRACING: CPT

## 2024-06-27 PROCEDURE — 25810000003 SODIUM CHLORIDE 0.9 % SOLUTION: Performed by: NURSE ANESTHETIST, CERTIFIED REGISTERED

## 2024-06-27 PROCEDURE — 25010000002 PROTAMINE SULFATE PER 10 MG: Performed by: NURSE ANESTHETIST, CERTIFIED REGISTERED

## 2024-06-27 PROCEDURE — 25010000002 HEPARIN (PORCINE) PER 1000 UNITS: Performed by: THORACIC SURGERY (CARDIOTHORACIC VASCULAR SURGERY)

## 2024-06-27 PROCEDURE — 02RF38Z REPLACEMENT OF AORTIC VALVE WITH ZOOPLASTIC TISSUE, PERCUTANEOUS APPROACH: ICD-10-PCS | Performed by: INTERNAL MEDICINE

## 2024-06-27 PROCEDURE — 33361 REPLACE AORTIC VALVE PERQ: CPT | Performed by: THORACIC SURGERY (CARDIOTHORACIC VASCULAR SURGERY)

## 2024-06-27 PROCEDURE — C1769 GUIDE WIRE: HCPCS | Performed by: THORACIC SURGERY (CARDIOTHORACIC VASCULAR SURGERY)

## 2024-06-27 PROCEDURE — 82330 ASSAY OF CALCIUM: CPT

## 2024-06-27 PROCEDURE — 25810000003 SODIUM CHLORIDE 0.9 % SOLUTION 250 ML FLEX CONT: Performed by: NURSE ANESTHETIST, CERTIFIED REGISTERED

## 2024-06-27 PROCEDURE — C1894 INTRO/SHEATH, NON-LASER: HCPCS | Performed by: THORACIC SURGERY (CARDIOTHORACIC VASCULAR SURGERY)

## 2024-06-27 PROCEDURE — 85347 COAGULATION TIME ACTIVATED: CPT

## 2024-06-27 PROCEDURE — 94799 UNLISTED PULMONARY SVC/PX: CPT

## 2024-06-27 PROCEDURE — 25010000002 ESMOLOL 100 MG/10ML SOLUTION: Performed by: NURSE ANESTHETIST, CERTIFIED REGISTERED

## 2024-06-27 PROCEDURE — C1760 CLOSURE DEV, VASC: HCPCS | Performed by: THORACIC SURGERY (CARDIOTHORACIC VASCULAR SURGERY)

## 2024-06-27 PROCEDURE — 80048 BASIC METABOLIC PNL TOTAL CA: CPT

## 2024-06-27 PROCEDURE — 25010000002 CEFAZOLIN 3 G RECONSTITUTED SOLUTION 1 EACH VIAL: Performed by: PHYSICIAN ASSISTANT

## 2024-06-27 PROCEDURE — 25810000003 SODIUM CHLORIDE PER 500 ML: Performed by: THORACIC SURGERY (CARDIOTHORACIC VASCULAR SURGERY)

## 2024-06-27 PROCEDURE — 25510000001 IOPAMIDOL PER 1 ML: Performed by: THORACIC SURGERY (CARDIOTHORACIC VASCULAR SURGERY)

## 2024-06-27 PROCEDURE — 82803 BLOOD GASES ANY COMBINATION: CPT

## 2024-06-27 PROCEDURE — 85027 COMPLETE CBC AUTOMATED: CPT

## 2024-06-27 PROCEDURE — 84132 ASSAY OF SERUM POTASSIUM: CPT

## 2024-06-27 PROCEDURE — 25010000002 HEPARIN (PORCINE) PER 1000 UNITS: Performed by: NURSE ANESTHETIST, CERTIFIED REGISTERED

## 2024-06-27 PROCEDURE — 85730 THROMBOPLASTIN TIME PARTIAL: CPT

## 2024-06-27 PROCEDURE — 25010000002 DEXAMETHASONE PER 1 MG: Performed by: NURSE ANESTHETIST, CERTIFIED REGISTERED

## 2024-06-27 PROCEDURE — 84295 ASSAY OF SERUM SODIUM: CPT

## 2024-06-27 PROCEDURE — B24BZZ4 ULTRASONOGRAPHY OF HEART WITH AORTA, TRANSESOPHAGEAL: ICD-10-PCS | Performed by: INTERNAL MEDICINE

## 2024-06-27 PROCEDURE — 93355 ECHO TRANSESOPHAGEAL (TEE): CPT

## 2024-06-27 PROCEDURE — 86850 RBC ANTIBODY SCREEN: CPT | Performed by: PHYSICIAN ASSISTANT

## 2024-06-27 PROCEDURE — C1887 CATHETER, GUIDING: HCPCS | Performed by: THORACIC SURGERY (CARDIOTHORACIC VASCULAR SURGERY)

## 2024-06-27 PROCEDURE — C1889 IMPLANT/INSERT DEVICE, NOC: HCPCS | Performed by: THORACIC SURGERY (CARDIOTHORACIC VASCULAR SURGERY)

## 2024-06-27 PROCEDURE — 25010000002 ONDANSETRON PER 1 MG: Performed by: NURSE ANESTHETIST, CERTIFIED REGISTERED

## 2024-06-27 PROCEDURE — 25810000003 LACTATED RINGERS PER 1000 ML: Performed by: ANESTHESIOLOGY

## 2024-06-27 PROCEDURE — 25010000002 HYDROMORPHONE PER 4 MG: Performed by: ANESTHESIOLOGY

## 2024-06-27 PROCEDURE — 25810000003 SODIUM CHLORIDE 0.9 % SOLUTION

## 2024-06-27 PROCEDURE — 85014 HEMATOCRIT: CPT

## 2024-06-27 PROCEDURE — 86923 COMPATIBILITY TEST ELECTRIC: CPT

## 2024-06-27 PROCEDURE — 25010000002 NITROGLYCERIN 200 MCG/ML SOLUTION: Performed by: NURSE ANESTHETIST, CERTIFIED REGISTERED

## 2024-06-27 PROCEDURE — 25010000002 FENTANYL CITRATE (PF) 100 MCG/2ML SOLUTION: Performed by: NURSE ANESTHETIST, CERTIFIED REGISTERED

## 2024-06-27 PROCEDURE — 25010000002 PHENYLEPHRINE 10 MG/ML SOLUTION 1 ML VIAL: Performed by: NURSE ANESTHETIST, CERTIFIED REGISTERED

## 2024-06-27 PROCEDURE — 86901 BLOOD TYPING SEROLOGIC RH(D): CPT | Performed by: PHYSICIAN ASSISTANT

## 2024-06-27 PROCEDURE — 25010000002 NICARDIPINE 2.5 MG/ML SOLUTION: Performed by: NURSE ANESTHETIST, CERTIFIED REGISTERED

## 2024-06-27 PROCEDURE — 93010 ELECTROCARDIOGRAM REPORT: CPT | Performed by: INTERNAL MEDICINE

## 2024-06-27 PROCEDURE — 86900 BLOOD TYPING SEROLOGIC ABO: CPT | Performed by: PHYSICIAN ASSISTANT

## 2024-06-27 PROCEDURE — B41G1ZZ FLUOROSCOPY OF LEFT LOWER EXTREMITY ARTERIES USING LOW OSMOLAR CONTRAST: ICD-10-PCS | Performed by: INTERNAL MEDICINE

## 2024-06-27 PROCEDURE — 82947 ASSAY GLUCOSE BLOOD QUANT: CPT

## 2024-06-27 PROCEDURE — 25010000002 NICARDIPINE 2.5 MG/ML SOLUTION 10 ML VIAL: Performed by: NURSE ANESTHETIST, CERTIFIED REGISTERED

## 2024-06-27 DEVICE — VLV HEART TRNSCATH SAPIEN/COMMANDER 23MM: Type: IMPLANTABLE DEVICE | Site: AORTA | Status: FUNCTIONAL

## 2024-06-27 RX ORDER — ONDANSETRON 4 MG/1
4 TABLET, ORALLY DISINTEGRATING ORAL EVERY 6 HOURS PRN
Status: DISCONTINUED | OUTPATIENT
Start: 2024-06-27 | End: 2024-06-28 | Stop reason: HOSPADM

## 2024-06-27 RX ORDER — NITROGLYCERIN 20 MG/100ML
INJECTION INTRAVENOUS AS NEEDED
Status: DISCONTINUED | OUTPATIENT
Start: 2024-06-27 | End: 2024-06-27 | Stop reason: SURG

## 2024-06-27 RX ORDER — BISACODYL 10 MG
10 SUPPOSITORY, RECTAL RECTAL DAILY PRN
Status: DISCONTINUED | OUTPATIENT
Start: 2024-06-27 | End: 2024-06-28 | Stop reason: HOSPADM

## 2024-06-27 RX ORDER — FENTANYL CITRATE 50 UG/ML
INJECTION, SOLUTION INTRAMUSCULAR; INTRAVENOUS AS NEEDED
Status: DISCONTINUED | OUTPATIENT
Start: 2024-06-27 | End: 2024-06-27 | Stop reason: SURG

## 2024-06-27 RX ORDER — ROCURONIUM BROMIDE 10 MG/ML
INJECTION, SOLUTION INTRAVENOUS AS NEEDED
Status: DISCONTINUED | OUTPATIENT
Start: 2024-06-27 | End: 2024-06-27 | Stop reason: SURG

## 2024-06-27 RX ORDER — PROTAMINE SULFATE 10 MG/ML
INJECTION, SOLUTION INTRAVENOUS AS NEEDED
Status: DISCONTINUED | OUTPATIENT
Start: 2024-06-27 | End: 2024-06-27 | Stop reason: SURG

## 2024-06-27 RX ORDER — DROPERIDOL 2.5 MG/ML
0.62 INJECTION, SOLUTION INTRAMUSCULAR; INTRAVENOUS ONCE AS NEEDED
Status: DISCONTINUED | OUTPATIENT
Start: 2024-06-27 | End: 2024-06-28

## 2024-06-27 RX ORDER — ESMOLOL HYDROCHLORIDE 10 MG/ML
INJECTION INTRAVENOUS AS NEEDED
Status: DISCONTINUED | OUTPATIENT
Start: 2024-06-27 | End: 2024-06-27 | Stop reason: SURG

## 2024-06-27 RX ORDER — POLYETHYLENE GLYCOL 3350 17 G/17G
17 POWDER, FOR SOLUTION ORAL DAILY PRN
Status: DISCONTINUED | OUTPATIENT
Start: 2024-06-27 | End: 2024-06-28 | Stop reason: HOSPADM

## 2024-06-27 RX ORDER — SODIUM CHLORIDE 9 MG/ML
40 INJECTION, SOLUTION INTRAVENOUS AS NEEDED
Status: DISCONTINUED | OUTPATIENT
Start: 2024-06-27 | End: 2024-06-27 | Stop reason: HOSPADM

## 2024-06-27 RX ORDER — NITROGLYCERIN 0.4 MG/1
0.4 TABLET SUBLINGUAL
Status: DISCONTINUED | OUTPATIENT
Start: 2024-06-27 | End: 2024-06-27 | Stop reason: HOSPADM

## 2024-06-27 RX ORDER — DEXAMETHASONE SODIUM PHOSPHATE 4 MG/ML
INJECTION, SOLUTION INTRA-ARTICULAR; INTRALESIONAL; INTRAMUSCULAR; INTRAVENOUS; SOFT TISSUE AS NEEDED
Status: DISCONTINUED | OUTPATIENT
Start: 2024-06-27 | End: 2024-06-27 | Stop reason: SURG

## 2024-06-27 RX ORDER — HYDROMORPHONE HYDROCHLORIDE 1 MG/ML
0.5 INJECTION, SOLUTION INTRAMUSCULAR; INTRAVENOUS; SUBCUTANEOUS
Status: DISCONTINUED | OUTPATIENT
Start: 2024-06-27 | End: 2024-06-28

## 2024-06-27 RX ORDER — ACETAMINOPHEN 325 MG/1
650 TABLET ORAL EVERY 4 HOURS PRN
Status: DISCONTINUED | OUTPATIENT
Start: 2024-06-27 | End: 2024-06-28 | Stop reason: HOSPADM

## 2024-06-27 RX ORDER — SODIUM CHLORIDE, SODIUM LACTATE, POTASSIUM CHLORIDE, CALCIUM CHLORIDE 600; 310; 30; 20 MG/100ML; MG/100ML; MG/100ML; MG/100ML
9 INJECTION, SOLUTION INTRAVENOUS CONTINUOUS
Status: DISCONTINUED | OUTPATIENT
Start: 2024-06-27 | End: 2024-06-28

## 2024-06-27 RX ORDER — BISACODYL 5 MG/1
5 TABLET, DELAYED RELEASE ORAL DAILY PRN
Status: DISCONTINUED | OUTPATIENT
Start: 2024-06-27 | End: 2024-06-28 | Stop reason: HOSPADM

## 2024-06-27 RX ORDER — ONDANSETRON 2 MG/ML
4 INJECTION INTRAMUSCULAR; INTRAVENOUS EVERY 6 HOURS PRN
Status: DISCONTINUED | OUTPATIENT
Start: 2024-06-27 | End: 2024-06-28 | Stop reason: HOSPADM

## 2024-06-27 RX ORDER — OXYMETAZOLINE HYDROCHLORIDE 0.05 G/100ML
2 SPRAY NASAL AS NEEDED
Status: DISCONTINUED | OUTPATIENT
Start: 2024-06-27 | End: 2024-06-28 | Stop reason: HOSPADM

## 2024-06-27 RX ORDER — ETOMIDATE 2 MG/ML
INJECTION INTRAVENOUS AS NEEDED
Status: DISCONTINUED | OUTPATIENT
Start: 2024-06-27 | End: 2024-06-27 | Stop reason: SURG

## 2024-06-27 RX ORDER — HYDROCODONE BITARTRATE AND ACETAMINOPHEN 7.5; 325 MG/1; MG/1
1 TABLET ORAL EVERY 4 HOURS PRN
Status: DISCONTINUED | OUTPATIENT
Start: 2024-06-27 | End: 2024-06-28 | Stop reason: HOSPADM

## 2024-06-27 RX ORDER — FAMOTIDINE 20 MG/1
20 TABLET, FILM COATED ORAL ONCE
Status: COMPLETED | OUTPATIENT
Start: 2024-06-27 | End: 2024-06-27

## 2024-06-27 RX ORDER — FENTANYL CITRATE 50 UG/ML
50 INJECTION, SOLUTION INTRAMUSCULAR; INTRAVENOUS
Status: DISCONTINUED | OUTPATIENT
Start: 2024-06-27 | End: 2024-06-28

## 2024-06-27 RX ORDER — ATORVASTATIN CALCIUM 40 MG/1
40 TABLET, FILM COATED ORAL EVERY EVENING
Status: DISCONTINUED | OUTPATIENT
Start: 2024-06-27 | End: 2024-06-28 | Stop reason: HOSPADM

## 2024-06-27 RX ORDER — AMOXICILLIN 250 MG
2 CAPSULE ORAL 2 TIMES DAILY PRN
Status: DISCONTINUED | OUTPATIENT
Start: 2024-06-27 | End: 2024-06-28 | Stop reason: HOSPADM

## 2024-06-27 RX ORDER — CHLORHEXIDINE GLUCONATE ORAL RINSE 1.2 MG/ML
15 SOLUTION DENTAL ONCE
Status: COMPLETED | OUTPATIENT
Start: 2024-06-27 | End: 2024-06-27

## 2024-06-27 RX ORDER — LISINOPRIL 20 MG/1
20 TABLET ORAL EVERY EVENING
Status: DISCONTINUED | OUTPATIENT
Start: 2024-06-27 | End: 2024-06-28 | Stop reason: HOSPADM

## 2024-06-27 RX ORDER — SODIUM CHLORIDE 9 MG/ML
250 INJECTION, SOLUTION INTRAVENOUS ONCE AS NEEDED
Status: DISCONTINUED | OUTPATIENT
Start: 2024-06-27 | End: 2024-06-28 | Stop reason: HOSPADM

## 2024-06-27 RX ORDER — MIDAZOLAM HYDROCHLORIDE 1 MG/ML
1 INJECTION INTRAMUSCULAR; INTRAVENOUS
Status: DISCONTINUED | OUTPATIENT
Start: 2024-06-27 | End: 2024-06-27 | Stop reason: HOSPADM

## 2024-06-27 RX ORDER — PHENYLEPHRINE HCL IN 0.9% NACL 1 MG/10 ML
SYRINGE (ML) INTRAVENOUS AS NEEDED
Status: DISCONTINUED | OUTPATIENT
Start: 2024-06-27 | End: 2024-06-27 | Stop reason: SURG

## 2024-06-27 RX ORDER — LIDOCAINE HYDROCHLORIDE 10 MG/ML
0.5 INJECTION, SOLUTION EPIDURAL; INFILTRATION; INTRACAUDAL; PERINEURAL ONCE AS NEEDED
Status: COMPLETED | OUTPATIENT
Start: 2024-06-27 | End: 2024-06-27

## 2024-06-27 RX ORDER — SODIUM CHLORIDE 9 MG/ML
100 INJECTION, SOLUTION INTRAVENOUS CONTINUOUS
Status: ACTIVE | OUTPATIENT
Start: 2024-06-27 | End: 2024-06-27

## 2024-06-27 RX ORDER — SODIUM CHLORIDE 9 MG/ML
INJECTION, SOLUTION INTRAVENOUS AS NEEDED
Status: DISCONTINUED | OUTPATIENT
Start: 2024-06-27 | End: 2024-06-27 | Stop reason: HOSPADM

## 2024-06-27 RX ORDER — NITROGLYCERIN 0.4 MG/1
0.4 TABLET SUBLINGUAL
Status: DISCONTINUED | OUTPATIENT
Start: 2024-06-27 | End: 2024-06-28 | Stop reason: HOSPADM

## 2024-06-27 RX ORDER — SODIUM CHLORIDE 0.9 % (FLUSH) 0.9 %
10 SYRINGE (ML) INJECTION EVERY 12 HOURS SCHEDULED
Status: DISCONTINUED | OUTPATIENT
Start: 2024-06-27 | End: 2024-06-27 | Stop reason: HOSPADM

## 2024-06-27 RX ORDER — MORPHINE SULFATE 2 MG/ML
2 INJECTION, SOLUTION INTRAMUSCULAR; INTRAVENOUS
Status: DISCONTINUED | OUTPATIENT
Start: 2024-06-27 | End: 2024-06-28 | Stop reason: HOSPADM

## 2024-06-27 RX ORDER — CHLORHEXIDINE GLUCONATE 500 MG/1
CLOTH TOPICAL EVERY 12 HOURS PRN
Status: DISCONTINUED | OUTPATIENT
Start: 2024-06-27 | End: 2024-06-27 | Stop reason: HOSPADM

## 2024-06-27 RX ORDER — ASPIRIN 81 MG/1
81 TABLET ORAL DAILY
Status: DISCONTINUED | OUTPATIENT
Start: 2024-06-28 | End: 2024-06-28 | Stop reason: HOSPADM

## 2024-06-27 RX ORDER — SODIUM CHLORIDE 0.9 % (FLUSH) 0.9 %
10 SYRINGE (ML) INJECTION AS NEEDED
Status: DISCONTINUED | OUTPATIENT
Start: 2024-06-27 | End: 2024-06-27 | Stop reason: HOSPADM

## 2024-06-27 RX ORDER — SODIUM CHLORIDE 9 MG/ML
INJECTION, SOLUTION INTRAVENOUS CONTINUOUS PRN
Status: DISCONTINUED | OUTPATIENT
Start: 2024-06-27 | End: 2024-06-27 | Stop reason: SURG

## 2024-06-27 RX ORDER — ONDANSETRON 2 MG/ML
INJECTION INTRAMUSCULAR; INTRAVENOUS AS NEEDED
Status: DISCONTINUED | OUTPATIENT
Start: 2024-06-27 | End: 2024-06-27 | Stop reason: SURG

## 2024-06-27 RX ORDER — POTASSIUM CHLORIDE 20 MEQ/1
20 TABLET, EXTENDED RELEASE ORAL ONCE
Status: COMPLETED | OUTPATIENT
Start: 2024-06-27 | End: 2024-06-27

## 2024-06-27 RX ORDER — CETIRIZINE HYDROCHLORIDE 10 MG/1
10 TABLET ORAL DAILY PRN
Status: DISCONTINUED | OUTPATIENT
Start: 2024-06-27 | End: 2024-06-28 | Stop reason: HOSPADM

## 2024-06-27 RX ORDER — HEPARIN SODIUM 1000 [USP'U]/ML
INJECTION, SOLUTION INTRAVENOUS; SUBCUTANEOUS AS NEEDED
Status: DISCONTINUED | OUTPATIENT
Start: 2024-06-27 | End: 2024-06-27 | Stop reason: SURG

## 2024-06-27 RX ORDER — NICARDIPINE HYDROCHLORIDE 2.5 MG/ML
INJECTION INTRAVENOUS AS NEEDED
Status: DISCONTINUED | OUTPATIENT
Start: 2024-06-27 | End: 2024-06-27 | Stop reason: SURG

## 2024-06-27 RX ORDER — HYDRALAZINE HYDROCHLORIDE 20 MG/ML
10 INJECTION INTRAMUSCULAR; INTRAVENOUS EVERY 6 HOURS PRN
Status: DISCONTINUED | OUTPATIENT
Start: 2024-06-27 | End: 2024-06-28 | Stop reason: HOSPADM

## 2024-06-27 RX ORDER — FLUTICASONE PROPIONATE 50 MCG
2 SPRAY, SUSPENSION (ML) NASAL DAILY PRN
Status: DISCONTINUED | OUTPATIENT
Start: 2024-06-27 | End: 2024-06-28 | Stop reason: HOSPADM

## 2024-06-27 RX ADMIN — PHENYLEPHRINE HYDROCHLORIDE 0.5 MCG/KG/MIN: 10 INJECTION INTRAVENOUS at 09:42

## 2024-06-27 RX ADMIN — SODIUM CHLORIDE: 9 INJECTION, SOLUTION INTRAVENOUS at 09:51

## 2024-06-27 RX ADMIN — ATORVASTATIN CALCIUM 40 MG: 40 TABLET, FILM COATED ORAL at 16:22

## 2024-06-27 RX ADMIN — SODIUM CHLORIDE 100 ML/HR: 9 INJECTION, SOLUTION INTRAVENOUS at 10:59

## 2024-06-27 RX ADMIN — CHLORHEXIDINE GLUCONATE 15 ML: 1.2 SOLUTION ORAL at 07:09

## 2024-06-27 RX ADMIN — SODIUM CHLORIDE, POTASSIUM CHLORIDE, SODIUM LACTATE AND CALCIUM CHLORIDE 9 ML/HR: 600; 310; 30; 20 INJECTION, SOLUTION INTRAVENOUS at 07:10

## 2024-06-27 RX ADMIN — ROCURONIUM BROMIDE 50 MG: 10 INJECTION INTRAVENOUS at 09:29

## 2024-06-27 RX ADMIN — FENTANYL CITRATE 100 MCG: 50 INJECTION, SOLUTION INTRAMUSCULAR; INTRAVENOUS at 09:34

## 2024-06-27 RX ADMIN — Medication 200 MCG: at 10:24

## 2024-06-27 RX ADMIN — SODIUM CHLORIDE 3 G: 900 INJECTION INTRAVENOUS at 09:34

## 2024-06-27 RX ADMIN — NICARDIPINE HYDROCHLORIDE 300 MCG: 25 INJECTION, SOLUTION INTRAVENOUS at 10:12

## 2024-06-27 RX ADMIN — ESMOLOL HYDROCHLORIDE 100 MG: 100 INJECTION, SOLUTION INTRAVENOUS at 09:29

## 2024-06-27 RX ADMIN — ONDANSETRON 4 MG: 2 INJECTION INTRAMUSCULAR; INTRAVENOUS at 09:51

## 2024-06-27 RX ADMIN — NICARDIPINE HYDROCHLORIDE 5 MG/HR: 25 INJECTION, SOLUTION INTRAVENOUS at 10:11

## 2024-06-27 RX ADMIN — NITROGLYCERIN 500 MCG: 20 INJECTION INTRAVENOUS at 09:33

## 2024-06-27 RX ADMIN — FAMOTIDINE 20 MG: 20 TABLET, FILM COATED ORAL at 07:09

## 2024-06-27 RX ADMIN — DEXAMETHASONE SODIUM PHOSPHATE 4 MG: 4 INJECTION INTRA-ARTICULAR; INTRALESIONAL; INTRAMUSCULAR; INTRAVENOUS; SOFT TISSUE at 09:51

## 2024-06-27 RX ADMIN — HYDROMORPHONE HYDROCHLORIDE 0.5 MG: 1 INJECTION, SOLUTION INTRAMUSCULAR; INTRAVENOUS; SUBCUTANEOUS at 11:34

## 2024-06-27 RX ADMIN — MUPIROCIN 1 APPLICATION: 20 OINTMENT TOPICAL at 07:10

## 2024-06-27 RX ADMIN — ETOMIDATE 20 MG: 40 INJECTION, SOLUTION INTRAVENOUS at 09:29

## 2024-06-27 RX ADMIN — LIDOCAINE HYDROCHLORIDE 0.5 ML: 10 INJECTION, SOLUTION EPIDURAL; INFILTRATION; INTRACAUDAL; PERINEURAL at 07:14

## 2024-06-27 RX ADMIN — PROTAMINE SULFATE 170 MG: 10 INJECTION, SOLUTION INTRAVENOUS at 10:16

## 2024-06-27 RX ADMIN — FENTANYL CITRATE 100 MCG: 50 INJECTION, SOLUTION INTRAMUSCULAR; INTRAVENOUS at 10:15

## 2024-06-27 RX ADMIN — HEPARIN SODIUM 17000 UNITS: 1000 INJECTION INTRAVENOUS; SUBCUTANEOUS at 09:58

## 2024-06-27 RX ADMIN — POTASSIUM CHLORIDE 20 MEQ: 1500 TABLET, EXTENDED RELEASE ORAL at 16:22

## 2024-06-27 NOTE — PROGRESS NOTES
"INTENSIVIST   PROGRESS NOTE     Hospital:  LOS: 0 days     Chief Complaint: Post-operative management     Subjective   S     HPI: Freddy Schumacher is a 59-year-old male with past medical history of dyslipidemia, hypertension, JHONNY and aortic stenosis. Patient follows with CTS at [facility] ([place], [place]) for severe aortic stenosis (mean gradient = 36).  Patient admitted to the ICU postoperatively following scheduled transcatheter aortic valve replacement.  Tolerated procedure well.  Hemodynamically stable and comfortable on initial assessment.    The patient's relevant past medical, surgical and social history were reviewed and updated in Epic as appropriate.      Objective   O     Intake/Ouptut 24 hrs (7:00AM - 6:59 AM)  Intake & Output (last 3 days)         06/24 0701  06/25 0700 06/25 0701  06/26 0700 06/26 0701 06/27 0700 06/27 0701 06/28 0700    I.V. (mL/kg)    700 (5.7)    IV Piggyback    100    Total Intake(mL/kg)    800 (6.6)    Net    +800             Medications (drips):  lactated ringers, Last Rate: 9 mL/hr (06/27/24 0924)  niCARdipine, Last Rate: Stopped (06/27/24 1143)  niCARdipine  phenylephrine    Respiratory Support: NC    Physical Examination:  Vital Signs: Blood pressure 118/64, pulse 80, temperature 98.2 °F (36.8 °C), temperature source Axillary, resp. rate 20, height 172.7 cm (68\"), weight 122 kg (270 lb), SpO2 96%.    General: The patient appears in no acute distress. Alert, cooperative and interactive.  Chest: Clear to auscultation bilaterally, No wheezing, rhonchi, or rales. Normal work of breathing. Equal chest rise.  Cardiac: Regular rhythm, normal rate, S1S2 auscultated. No murmurs, rubs or gallops.   Extremities: No lower extremity edema. No clubbing or cyanosis.  Skin: No rashes, open wounds, or bruising. Warm, dry, well-perfused.  Neuro: Motor power grossly intact bilaterally. Sensation intact. Speech fluid and fluent. Thought process coherent.  Psych: Alert and oriented x3. Mood " stable.    Lines, Drains & Airways       Active LDAs       Name Placement date Placement time Site Days    Peripheral IV 06/27/24 0709 Posterior;Right Hand 06/27/24  0709  Hand  less than 1    Arterial Line 06/27/24 Right Radial 06/27/24  0904  created via procedure documentation  Radial  less than 1        Results from last 7 days   Lab Units 06/27/24  1117 06/27/24  1051   WBC 10*3/mm3  --  11.27*   HEMOGLOBIN g/dL  --  13.4   HEMOGLOBIN, POC g/dL 12.6  --    MCV fL  --  92.1   PLATELETS 10*3/mm3  --  238     Results from last 7 days   Lab Units 06/27/24  1051   SODIUM mmol/L 136   POTASSIUM mmol/L 4.6   CO2 mmol/L 22.0   CREATININE mg/dL 0.87   GLUCOSE mg/dL 128*     Estimated Creatinine Clearance: 114.7 mL/min (by C-G formula based on SCr of 0.87 mg/dL).      Results from last 7 days   Lab Units 06/27/24  1117   PH, ARTERIAL pH units 7.43     Images:  No radiology results for the last day    Results: Reviewed.  - I reviewed the patient's new laboratory and imaging results.  - I independently reviewed the patient's new images.    Medications: Reviewed.    Assessment & Plan    A / P     Active Hospital Problems:  Active Hospital Problems    Diagnosis  POA    **Aortic valve disorder [I35.9]  Unknown    Severe aortic valve stenosis [I35.0]  Yes      Resolved Hospital Problems   No resolved problems to display.     #Severe aortic stenosis  -Status post TAVR   -Continue tight blood pressure control per unit protocol; currently on nicardipine drip  -Glucose management per unit protocol  -Pain management as needed;Tylenol ordered  -Continue aspirin  -Advance diet as tolerated    High level of risk due to severe exacerbation of chronic illness and illness with threat to life or bodily function.    I conducted multidisciplinary rounds in the plan of care was discussed with the multidisciplinary team at that time. In attendance at multidisciplinary rounds was clinical pharmacist, dietitian, nursing staff and case  management.    I discussed the patient's findings and my recommendations with patient, family, nursing staff, and consulting provider.     -- Gomez Hollis MD  Pulmonary/Critical Care

## 2024-06-27 NOTE — ANESTHESIA PROCEDURE NOTES
Arterial Line      Patient reassessed immediately prior to procedure    Patient location during procedure: pre-op   Line placed for hemodynamic monitoring.  Performed By   Anesthesiologist: Stan Elias MD   Preanesthetic Checklist  Completed: patient identified, IV checked, site marked, risks and benefits discussed, surgical consent, monitors and equipment checked, pre-op evaluation and timeout performed  Arterial Line Prep    Sterile Tech: cap, gloves and sterile barriers  Prep: ChloraPrep  Patient monitoring: blood pressure monitoring, continuous pulse oximetry and EKG  Arterial Line Procedure   Laterality:right  Location:  radial artery  Catheter size: 20 G   Guidance: ultrasound guided and palpation technique  Number of attempts: 1  Successful placement: yes   Post Assessment   Dressing Type: line sutured, occlusive dressing applied, secured with tape and wrist guard applied.   Complications no  Circ/Move/Sens Assessment: normal and unchanged.   Patient Tolerance: patient tolerated the procedure well with no apparent complications

## 2024-06-27 NOTE — OP NOTE
Transcatheter aortic valve implantation    Indication: Severe aortic stenosis      Interventional cardiologist: Donny Schumacher MD/Amira Alcaraz MD  Cardiothoracic surgeons: Surjit Joshi MD/Anthony Devlin MD      Procedures:    Insertion 6 [place] sheath left femoral artery  Insertion 8 [place] sheath left femoral vein  Insertion 5 [place] temporary pacing wire right ventricle  Insertion 6 [place] pigtail catheter ascending aorta  Deployment of #23 Lopez MEGHAN 3 tissue valve  Multiple intraprocedural aortograms      Description of procedure:    Informed signed consent was obtained the patient brought to the OR and placed on the table in the fasting postabsorptive state.  Standard surgical prep was performed from mid chest to mid thigh.  Then, using modified Seldinger technique a 6 [place] sheath was placed in the left femoral artery and flushed with heparinized saline.  In a similar fashion an 8 [place] sheath was then placed in the left femoral vein and flushed with heparinized saline.  A 5 [place] temporary pacing wire was advanced from the femoral vein sheath to the right ventricular apex and proper capture verified.  A 6 [place] pigtail catheter was advanced from the left femoral artery sheath and advanced to the ascending aorta.  Upon insertion of the catheters 14 [place] access was obtained in the right femoral artery by Dr. Joshi.  Via the 14 [place] sheath in the right femoral artery the aortic valve was crossed with a 6 [place] AL 2 coronary catheter and a straight wire.  The AL 2 catheter was advanced over the straight wire and into the left ventricle and the straight wire exchanged for a safari wire.  The AL 2 catheter was then removed maintaining safari wire position in the left ventricular apex.  Then, the #23 Lopez MEGHAN 3 tissue valve was advanced into the descending aorta over the safari wire and prepped per the standard technique.  The valve was then advanced into proper position guided by  fluoroscopy across the native aortic valve.  The valve was then deployed using a rapid ventricular pacing protocol.  Post procedure no significant aortic insufficiency was noted.  The 14 Irish access/sheath in the femoral artery was then removed by Dr. Joshi with the Perclose technique.  The pigtail catheter and temporary pacing wire were then removed and the sheaths in the left femoral vessels secured in place and the patient prepared for transport back to his room in satisfactory condition    Conclusion: Successful FERDINAND with #23 Lopez MEGHAN 3 tissue valve there were no immediate complications noted      Donny Schumacher MD

## 2024-06-27 NOTE — PLAN OF CARE
Goal Outcome Evaluation:         1. Neuro-  Mr Bottom is alert, oriented, moves all extremities equally.  Bedrest until 6 pm    2. Respiratory- 2 LT NC kept oxygen saturation greater than 94%.    3. Cardiac-  Sinus rhythm (HR 70-80 bpm) and -125 mmHg.    4. GI-  Ate 75% of meals.    5. - 1500 ml urine output via urinal

## 2024-06-27 NOTE — ANESTHESIA POSTPROCEDURE EVALUATION
Patient: Freddy Schumacher    Procedure Summary       Date: 06/27/24 Room / Location:  [facility] / [facility]    Anesthesia Start: 0924 Anesthesia Stop: 1039    Procedures:       TRANSCATHETER AORTIC VALVE REPLACEMENT (Chest)      TRANSESOPHAGEAL ECHOCARDIOGRAM WITH ANESTHESIA (Chest)      Transfemoral Transcatheter Aortic Valve Replacement Diagnosis:       Aortic valve disorder      (Aortic valve disorder [I35.9])    Surgeons: Surjit Joshi MD; Donny Schumacher MD Provider: Sowmya Zaldivar DO    Anesthesia Type: generalSonya ASA Status: 4            Anesthesia Type: general, [place]    Vitals  Vitals Value Taken Time   BP     Temp     Pulse 86 06/27/24 1042   Resp     SpO2 97 % 06/27/24 1042   Vitals shown include unfiled device data.        Post Anesthesia Care and Evaluation    Patient location during evaluation: ICU  Patient participation: complete - patient participated  Level of consciousness: awake and alert  Pain score: 0  Pain management: adequate    Airway patency: patent  Anesthetic complications: No anesthetic complications  PONV Status: none  Cardiovascular status: hemodynamically stable and acceptable  Respiratory status: nonlabored ventilation, acceptable, nasal cannula and spontaneous ventilation  Hydration status: acceptable    Comments: VS monitored en route to ICU, pt spont ventilating, VSS upon arrival and report given  No anesthesia care post op

## 2024-06-27 NOTE — NURSING NOTE
Pt. Referred for Phase II Cardiac Rehab. Staff discussed benefits of exercise, program protocol, and educational material provided. Teach back verified.  Patient scheduled for orientation at [facility] on 7/17/24 at 1:00 PM .

## 2024-06-27 NOTE — PROGRESS NOTES
Structural Heart Team Meeting Summary         Patient Name: Freddy Schumacher  YOB: 1965     Referring Physician: Dr. Schumacher  Admission Status: Inpatient     Attendees: Surjit Joshi MD, Ivelisse Cuadra MD, Yanelis Hernandez MD, Donny Schumacher MD, Lopez Clinical Specialist, Trent Howell, Sowmya Zaldivar DO, Anthony Devlin MD, and LIZ Pittman  Primary presentation of AS: Heart Failure   Heart Failure:  HFpEF   NYHA Functional Class: II   LVEF:  61-65%   ANNULUS Measurement: 2.5cm    Major Organ Compromise: N/A      Procedure Specific Impediment: N/A      Other Factors:   Major Nutritional Deficit: No  Cognitive Impairment: No  Oxygen dependent: No    STS Risk Score:   Mortality Risk: 1.29%  Mortality and Morbidity Risk: 6.55%    TAVR/TMVR Rationale: Frail, elderly male with severe symptomatic AS and low STS risk score for TAVR. Structural heart team in agreement to proceed with TAVR           Diagnostic Studies Discussed/ Reviewed:  TTE, CK, carotid ultrasound, LHC, CTA and EKG. PAT labs unremarkable    Procedure planning details:   Valve Size: 23mm (+2)  Cardiology sheath access: Left femoral  CT Surgery sheath access: Right femoral    Post Procedure Considerations: Bleeding at groin sites and groin site healing with BMI 42, monitor for arrhythmias and s/s of CVA, encourage early ambulation and pulmonary hygiene.

## 2024-06-27 NOTE — ANESTHESIA PROCEDURE NOTES
Intra-Op Anesthesia CK    Procedure Performed: Intra-Op Anesthesia CK       Start Time:        End Time:      Preanesthesia Checklist:  Patient identified, IV assessed, risks and benefits discussed, monitors and equipment assessed, procedure being performed at surgeon's request and anesthesia consent obtained.    General Procedure Information  Diagnostic Indications for Echo:  assessment of ascending aorta, assessment of surgical repair and hemodynamic monitoring  Physician Requesting Echo: Surjit Joshi MD  Location performed:  OR  Intubated  Bite block placed  Heart visualized  Probe Insertion:  Easy  Probe Type:  Multiplane  Modalities:  2D only, continuous wave Doppler, color flow mapping and pulse wave Doppler        Anesthesia Information      Echocardiogram Comments:       Abbreviated CK for TAVR by BEE Zaldivar DO  TAVR team:    Dr Joshi, Winsome Cuadra Aslam  CK passed with ease after preop RBA discussed    Baseline:   LVEF 60% without obvious WMA.  RV function preserved, mild dilation.  Tricuspid AV with severe stenosis, ELZBIETA 0.9 by CE, mean gradient 33mmhg; annulus 25mm; trace AI.  Trace MR, Mild Tr, trace pi.    No PFO, LEELA clot, baseline effusion.  Smooth, nonmobile atheroma in descending aorta.  Findings reviewed with TAVR team in real time.     Post TAVR:  S/p 23mm bioprosthesis - appears well seated, no rocking motion, leaflets opening well, trace PVL under AMVL and near RCC, mean gradient 10 mmhg.  RV and LV function preserved, no new valvulopathy nor effusion.  Visualized portions of aorta intact.  Findings shared with TAVR team in OR.

## 2024-06-27 NOTE — ANESTHESIA PROCEDURE NOTES
Airway  Urgency: elective    Date/Time: 6/27/2024 9:32 AM  Airway not difficult    General Information and Staff    Patient location during procedure: OR  CRNA/CAA: Trent Smith CRNA    Indications and Patient Condition  Indications for airway management: airway protection    Preoxygenated: yes  MILS not maintained throughout  Mask difficulty assessment: 1 - vent by mask    Final Airway Details  Final airway type: endotracheal airway      Successful airway: ETT  Cuffed: yes   Successful intubation technique: direct laryngoscopy  Endotracheal tube insertion site: oral  Blade: Danae  Blade size: 3  ETT size (mm): 7.0  Cormack-Lehane Classification: grade I - full view of glottis  Placement verified by: chest auscultation and capnometry   Measured from: lips  ETT/EBT  to lips (cm): 20  Number of attempts at approach: 1  Assessment: lips, teeth, and gum same as pre-op and atraumatic intubation    Additional Comments  Preoxygenated, ASA monitors applied.  SIVI.  DL mac3 gr4 view with CP;  repeat DL Zambrano X3 blade gr 2a view - ETT placed with ease.

## 2024-06-27 NOTE — OP NOTE
Operative Report  Freddy Schumacher  [id number]  1965    Preop Diagnosis: Severe aortic valve stenosis    Results of STS risk score discussed with patient and family    Postop Diagnosis same        Procedure: #1 TAVR with Lopez MEGHAN #23 valve #2 completion right iliac and femoral angiogram        Surgeons: Surjit Joshi  Cardiologist Dr. Donny Schumacher, Dr. Seymour Hernandez      Assistant: Anthony Devlin  Assistant: Miki Ramirez PA was responsible for performing the following activities: Retraction, Suction, Closing, and Placing Dressing and their skilled assistance was necessary for the success of this case.       Operative Findings:         Description: Patient was brought to the operating room placed supine position under general anesthesia.  Patient's chest abdomen groins were prepped and draped in usual sterile fashion.  Patient had placement of the left femoral arterial venous sheath.  A temporary pacemaker was wire was placed as well as a pigtail catheter in the coronary sinus to delineate proper angulation for valve deployment.  Right groin was stuck using modified Seldinger technique.  The patient was heparinized.  2 Perclose's were placed per protocol.  A advantage wire navigated the iliac and aorta.  A 14 [place] sheath was placed over this.  At this time with IL 1 catheter as there is able to cross the valve with a straight wire.  This was exchanged for a safari wire.  This time the valve was placed in the descending aorta and the sheath.  It was advanced across the transverse arch across the valve.  This time with rapid ventricular pacing 180 bpm and angiography confirmation of valve was deployed without difficulty.  Completion CK and angiography revealed excellent result with trivial aortic insufficiency.  The device was removed.  The Perclose's were tied down.  Pigtail catheter was pulled back to L1 and a right iliac femoral angiogram was done which revealed excellent flow to the right leg.   Patient was reversed with protamine.  The contrast was 65 cc of Isovue, radiation dose 963 mGy, and fluoroscopy time was 6 minutes 24 seconds.        EBL: 100 cc      Please note that portions of this note were completed with a voice recognition program. Efforts were made to edit the dictations, but words may be mistranscribed      Surjit Joshi MD  06/27/24 10:23 EDT

## 2024-06-27 NOTE — H&P
Pre-Op H&P  Freddy Schumacher  [id number]  1965      Chief complaint: Dyspnea on exertion       Subjective:  Patient is a 59 y.o.male presents for scheduled surgery by Dr. Joshi. He anticipates a TRANSCATHETER AORTIC VALVE REPLACEMENT; TRANSESOPHAGEAL ECHOCARDIOGRAM WITH ANESTHESIA  today. He has been getting very short of breath with activity particularly going up inclines or stairs. CTA on 4/5/24 showed dense calcification of the aortic valve leaflets. Severe aortic stenosis with a mean gradient of 36 V-max of 409.       Review of Systems:  Constitutional-- No fever, chills or sweats. No fatigue.  CV-- No chest pain, palpitation or syncope. +HTN, HLD, GONZALEZ  Resp-- No cough, hemoptysis. +JHONNY on cpap, SOB  Skin--No rashes or lesions      Allergies: No Known Allergies      Home Meds:  Medications Prior to Admission   Medication Sig Dispense Refill Last Dose    aspirin 81 MG EC tablet Take 1 tablet by mouth Every Evening.   6/26/2024 at 2200    atorvastatin (LIPITOR) 40 MG tablet Take 1 tablet by mouth Every Evening.   6/26/2024 at 2200    cetirizine (zyrTEC) 10 MG tablet Take 1 tablet by mouth Daily As Needed for Allergies or Rhinitis.   Past Week    fluticasone (FLONASE) 50 MCG/ACT nasal spray 2 sprays into the nostril(s) as directed by provider Daily As Needed for Rhinitis or Allergies.   6/26/2024 at 2200    lisinopril (PRINIVIL,ZESTRIL) 20 MG tablet Take 1 tablet by mouth Every Evening.   6/26/2024 at 2200    oxymetazoline (AFRIN) 0.05 % nasal spray 2 sprays into the nostril(s) as directed by provider As Needed for Congestion.   6/25/2024         PMH:   Past Medical History:   Diagnosis Date    Aortic stenosis     Heart murmur     Hyperlipidemia     Hypertension     JHONNY on CPAP     Seasonal allergies      PSH:    Past Surgical History:   Procedure Laterality Date    CARDIAC CATHETERIZATION N/A 03/28/2024    Procedure: Left Heart Cath;  Surgeon: Donny Schumacher MD;  Location: [facility] INVASIVE  "LOCATION;  Service: Cardiology;  Laterality: N/A;    COLONOSCOPY      KNEE MENISCAL REPAIR Right        Immunization History:  Influenza: No  Pneumococcal: No  Tetanus: UTD  Covid : No    Social History:   Tobacco:   Social History     Tobacco Use   Smoking Status Never   Smokeless Tobacco Never      Alcohol:     Social History     Substance and Sexual Activity   Alcohol Use Never         Physical Exam:/89 (BP Location: Right arm, Patient Position: Lying) Comment: L arm 103/52  Pulse 79   Temp 98.2 °F (36.8 °C) (Temporal)   Resp 16   Ht 172.7 cm (68\")   Wt 122 kg (270 lb)   SpO2 95%   BMI 41.05 kg/m²       General Appearance:    Alert, cooperative, no distress, appears stated age   Head:    Normocephalic, without obvious abnormality, atraumatic   Lungs:     Clear to auscultation bilaterally, respirations unlabored    Heart:   Regular rate and rhythm, S1 and S2 normal    Abdomen:    Soft without tenderness   Extremities:   Extremities normal, atraumatic, no cyanosis or edema   Skin:   Skin color, texture, turgor normal, no rashes or lesions   Neurologic:   Grossly intact     Results Review:     LABS:  Lab Results   Component Value Date    WBC 8.35 06/19/2024    HGB 13.6 06/19/2024    HCT 40.1 06/19/2024    MCV 89.7 06/19/2024     06/19/2024    NEUTROABS 5.29 06/19/2024    GLUCOSE 116 (H) 06/19/2024    BUN 12 06/19/2024    CREATININE 0.99 06/19/2024    EGFRIFNONA 68 02/26/2022     06/19/2024    K 4.2 06/19/2024     06/19/2024    CO2 23.0 06/19/2024    MG 2.0 06/19/2024    CALCIUM 8.8 06/19/2024    ALBUMIN 4.0 06/19/2024    AST 36 06/19/2024    ALT 49 (H) 06/19/2024    BILITOT 0.7 06/19/2024       RADIOLOGY:    Study Result    Narrative & Impression   CT ANGIO TAVR CHEST ABDOMEN PELVIS     Date of Exam: 4/5/2024 1:48 PM EDT     Indication: severe aortic stenosis, TAVR evaluation.     Comparison: None available.     TECHNIQUE: 3 mm unenhanced images through the chest, abdomen and pelvis, " subsequent 1.5 mm arterial phase images, 2D reconstructions and 3D VRT and MIP angiographic reconstructions. Additional small field-of-view 1 mm reconstructions with attention to   the heart and aortic valve. A total of 100 mL IV Isovue 370 was administered.     FINDINGS:   Unenhanced images show dense and extensive aortic valve calcification and moderate coronary artery calcification.     Angiographic images show maximal diameter of the ascending thoracic aorta to be approximately 3.1 cm. No dissection is seen. There is minimal atherosclerotic calcification of the thoracoabdominal aorta and iliac vessels but no evidence of significant   aortoiliac stenosis or proximal superficial femoral stenosis. There is no evidence of aneurysm.     No thrombus is seen in the atrial appendages. Contrast opacification of the pulmonary arteries is sufficient to exclude any large emboli. No mediastinal mass or adenopathy is seen. No pericardial or pleural effusion is identified. Lungs appear grossly   clear.     The body wall soft tissues demonstrate no acute or suspicious findings. The osseous structures demonstrate no evidence of fracture or suspicious osseous lesion. The liver, spleen, pancreas and bilateral adrenal glands demonstrate homogeneous enhancement   without suspicious focal lesion. Unremarkable gallbladder. The kidneys appear normal. Small and large bowel loops are nondilated. The appendix is normal. There is no free fluid or pneumoperitoneum. The pelvic viscera demonstrate no acute findings. There   is no worrisome retroperitoneal lymphadenopathy.     IMPRESSION:  1. CTA of the chest, abdomen and pelvis, with image data saved per TAVR protocol.  2. Dense calcification of the aortic valve leaflets. No evidence of thoracic aortic aneurysm or dissection. No evidence of significant aortoiliac luminal stenosis.  3. No other evidence of acute disease in the chest, abdomen or pelvis.     3/28/24 heart  cath:  Conclusion    Continue TAVR workup     Procedure Narrative    Left heart catheterization with coronary angiography and aortography    Indication: 59-year-old man with severe aortic stenosis by echocardiography undergoing TAVR workup    The procedure was performed in the right wrist.  Using modified Seldinger technique a 6 Georgian sheath was placed in the right radial artery and flushed with a radial cocktail.  5 Georgian Carmenza catheters were used to perform coronary angiography and aortic root injection.  At the completion of the procedure catheters removed the sheath removed and the radial band was applied.  No complications were noted and the results of study were as follows    Aortic root injection revealed no aortic root dissection or dilatation.  No significant aortic insufficiency was noted.    Coronary angiography    Right coronary artery: Anatomically dominant vessel free of disease    Left main coronary artery: Normal    Left anterior descending coronary artery: Normal    Circumflex coronary artery: Normal    Conclusion: Normal right dominant coronary angiogram.Risks, benefits and alternatives were discussed with the patient and/or family. Plan is for minimal sedation. Under my direct supervision, intravenous minimal sedation sedation was administered during the course of this procedure with continuous monitoring of hemodynamic parameters and level of consciousness by an independent trained observer. Less than 20 mL of estimated blood loss during the case. No specimen was collected during the case.       I reviewed the patient's new clinical results.    Cancer Staging (if applicable)  Cancer Patient: __ yes __no __unknown; If yes, clinical stage T:__ N:__M:__, stage group or __N/A      Impression: Aortic valve disorder      Plan: TRANSCATHETER AORTIC VALVE REPLACEMENT; TRANSESOPHAGEAL ECHOCARDIOGRAM WITH ANESTHESIA       LIZ Pemberton   6/27/2024   07:22 EDT

## 2024-06-28 ENCOUNTER — APPOINTMENT (OUTPATIENT)
Dept: CARDIOLOGY | Facility: HOSPITAL | Age: 59
DRG: 267 | End: 2024-06-28
Payer: COMMERCIAL

## 2024-06-28 VITALS
OXYGEN SATURATION: 96 % | RESPIRATION RATE: 20 BRPM | SYSTOLIC BLOOD PRESSURE: 136 MMHG | WEIGHT: 270 LBS | HEIGHT: 68 IN | HEART RATE: 100 BPM | BODY MASS INDEX: 40.92 KG/M2 | DIASTOLIC BLOOD PRESSURE: 73 MMHG | TEMPERATURE: 97.9 F

## 2024-06-28 LAB
ANION GAP SERPL CALCULATED.3IONS-SCNC: 11 MMOL/L (ref 5–15)
BH CV ECHO MEAS - AO MAX PG: 28.9 MMHG
BH CV ECHO MEAS - AO MEAN PG: 15 MMHG
BH CV ECHO MEAS - AO ROOT DIAM: 2.2 CM
BH CV ECHO MEAS - AO V2 MAX: 269 CM/SEC
BH CV ECHO MEAS - AO V2 VTI: 46.7 CM
BH CV ECHO MEAS - AVA(I,D): 1.63 CM2
BH CV ECHO MEAS - EDV(CUBED): 97.3 ML
BH CV ECHO MEAS - EDV(MOD-SP2): 105 ML
BH CV ECHO MEAS - EDV(MOD-SP4): 105 ML
BH CV ECHO MEAS - EF(MOD-BP): 62.5 %
BH CV ECHO MEAS - EF(MOD-SP2): 62.6 %
BH CV ECHO MEAS - EF(MOD-SP4): 61.1 %
BH CV ECHO MEAS - ESV(CUBED): 27 ML
BH CV ECHO MEAS - ESV(MOD-SP2): 39.3 ML
BH CV ECHO MEAS - ESV(MOD-SP4): 40.8 ML
BH CV ECHO MEAS - FS: 34.8 %
BH CV ECHO MEAS - IVS/LVPW: 1 CM
BH CV ECHO MEAS - IVSD: 1.2 CM
BH CV ECHO MEAS - LA DIMENSION: 3.4 CM
BH CV ECHO MEAS - LV MASS(C)D: 205 GRAMS
BH CV ECHO MEAS - LV MAX PG: 7.7 MMHG
BH CV ECHO MEAS - LV MEAN PG: 4 MMHG
BH CV ECHO MEAS - LV V1 MAX: 139 CM/SEC
BH CV ECHO MEAS - LV V1 VTI: 24.2 CM
BH CV ECHO MEAS - LVIDD: 4.6 CM
BH CV ECHO MEAS - LVIDS: 3 CM
BH CV ECHO MEAS - LVOT AREA: 3.1 CM2
BH CV ECHO MEAS - LVOT DIAM: 2 CM
BH CV ECHO MEAS - LVPWD: 1.2 CM
BH CV ECHO MEAS - SV(LVOT): 76 ML
BH CV ECHO MEAS - SV(MOD-SP2): 65.7 ML
BH CV ECHO MEAS - SV(MOD-SP4): 64.2 ML
BH CV VAS BP LEFT ARM: NORMAL MMHG
BUN SERPL-MCNC: 20 MG/DL (ref 8–23)
BUN/CREAT SERPL: 20.8 (ref 7–25)
CALCIUM SPEC-SCNC: 8.9 MG/DL (ref 8.6–10.5)
CHLORIDE SERPL-SCNC: 100 MMOL/L (ref 98–107)
CO2 SERPL-SCNC: 24 MMOL/L (ref 22–29)
CREAT SERPL-MCNC: 0.96 MG/DL (ref 0.76–1.27)
DEPRECATED RDW RBC AUTO: 41 FL (ref 37–54)
EGFRCR SERPLBLD CKD-EPI 2021: 90.5 ML/MIN/1.73
ERYTHROCYTE [DISTWIDTH] IN BLOOD BY AUTOMATED COUNT: 11.9 % (ref 12.3–15.4)
GLUCOSE SERPL-MCNC: 137 MG/DL (ref 65–99)
HCT VFR BLD AUTO: 40 % (ref 37.5–51)
HGB BLD-MCNC: 13 G/DL (ref 13–17.7)
MCH RBC QN AUTO: 30.5 PG (ref 26.6–33)
MCHC RBC AUTO-ENTMCNC: 32.5 G/DL (ref 31.5–35.7)
MCV RBC AUTO: 93.9 FL (ref 79–97)
PLATELET # BLD AUTO: 269 10*3/MM3 (ref 140–450)
PMV BLD AUTO: 10.9 FL (ref 6–12)
POTASSIUM SERPL-SCNC: 4.5 MMOL/L (ref 3.5–5.2)
QT INTERVAL: 340 MS
QTC INTERVAL: 406 MS
RBC # BLD AUTO: 4.26 10*6/MM3 (ref 4.14–5.8)
SODIUM SERPL-SCNC: 135 MMOL/L (ref 136–145)
WBC NRBC COR # BLD AUTO: 21.92 10*3/MM3 (ref 3.4–10.8)

## 2024-06-28 PROCEDURE — 25010000002 SULFUR HEXAFLUORIDE MICROSPH 60.7-25 MG RECONSTITUTED SUSPENSION

## 2024-06-28 PROCEDURE — 85027 COMPLETE CBC AUTOMATED: CPT

## 2024-06-28 PROCEDURE — 93005 ELECTROCARDIOGRAM TRACING: CPT

## 2024-06-28 PROCEDURE — 93325 DOPPLER ECHO COLOR FLOW MAPG: CPT

## 2024-06-28 PROCEDURE — 94799 UNLISTED PULMONARY SVC/PX: CPT

## 2024-06-28 PROCEDURE — 93308 TTE F-UP OR LMTD: CPT

## 2024-06-28 PROCEDURE — 99238 HOSP IP/OBS DSCHRG MGMT 30/<: CPT | Performed by: THORACIC SURGERY (CARDIOTHORACIC VASCULAR SURGERY)

## 2024-06-28 PROCEDURE — 80048 BASIC METABOLIC PNL TOTAL CA: CPT

## 2024-06-28 PROCEDURE — 93010 ELECTROCARDIOGRAM REPORT: CPT | Performed by: INTERNAL MEDICINE

## 2024-06-28 PROCEDURE — 93321 DOPPLER ECHO F-UP/LMTD STD: CPT

## 2024-06-28 PROCEDURE — 94660 CPAP INITIATION&MGMT: CPT

## 2024-06-28 RX ADMIN — ASPIRIN 81 MG: 81 TABLET, COATED ORAL at 08:01

## 2024-06-28 RX ADMIN — SULFUR HEXAFLUORIDE 2 ML: KIT at 09:30

## 2024-06-28 NOTE — CASE MANAGEMENT/SOCIAL WORK
Discharge Planning Assessment  [facility]     Patient Name: Freddy Schumacher  MRN: [id number]  Today's Date: 6/28/2024    Admit Date: 6/27/2024    Plan: IDP   Discharge Needs Assessment    No documentation.                  Discharge Plan       Row Name 06/28/24 0943       Plan    Plan IDP    Patient/Family in Agreement with Plan yes    Plan Comments Spoke with patient and spouse at bedside pending discharge. Has ambulated in hallway without difficulty. Denies any discharge needs at this time.    Final Discharge Disposition Code 01 - home or self-care                  Continued Care and Services - Admitted Since 6/27/2024    No active coordination exists for this encounter.       Expected Discharge Date and Time       Expected Discharge Date Expected Discharge Time    Jun 28, 2024            Demographic Summary       Row Name 06/28/24 0943       General Information    Admission Type inpatient    Arrived From home    Referral Source admission list    Reason for Consult discharge planning                   Functional Status       Row Name 06/28/24 0943       Functional Status    Current Activity Tolerance good       Physical Activity    On average, how many days per week do you engage in moderate to strenuous exercise (like a brisk walk)? 3 days    On average, how many minutes do you engage in exercise at this level? 30 min    Number of minutes of exercise per week 90       Functional Status, IADL    Medications independent    Meal Preparation independent    Housekeeping independent    Laundry independent    Shopping independent                   Psychosocial    No documentation.                  Abuse/Neglect    No documentation.                  Legal    No documentation.                  Substance Abuse    No documentation.                  Patient Forms    No documentation.                     Marita Tam RN

## 2024-06-28 NOTE — PLAN OF CARE
Goal Outcome Evaluation:         Pt a/ox4, pleasant, cooperative. Pt ambulated for the second time at 2145 220ft. RA, CPAP at HS. NSR on monitor. UOP 1300ml. BS+. Bilateral groin sites CDI and soft. No c/o pain. No issues at this time.

## 2024-06-28 NOTE — PROGRESS NOTES
"                                 [facility] Progress Note      LOS: 1 day   Patient Care Team:  Jamal Turcios MD as PCP - General (Family Medicine)    Chief Complaint: Shortness of breath      Subjective     Interval History:   Uneventful evening    Patient Complaints: No chest pain or dyspnea      Review of Systems:   A 14 point review of systems was negative except as was stated in the HPI      Objective     Vital Sign Min/Max for last 24 hours  Temp  Min: 97.9 °F (36.6 °C)  Max: 98.5 °F (36.9 °C)   BP  Min: 97/64  Max: 136/74   Pulse  Min: 75  Max: 104   Resp  Min: 16  Max: 20   SpO2  Min: 89 %  Max: 99 %   Flow (L/min)  Min: 2  Max: 2   No data recorded     Flowsheet Rows      Flowsheet Row First Filed Value   Admission Height 172.7 cm (68\") Documented at 06/27/2024 0707   Admission Weight 122 kg (270 lb) Documented at 06/27/2024 0707            Physical Exam:  General Appearance: Alert, appears stated age and cooperative  Lungs: Clear to auscultation  Heart:: RRR 1/6 systolic murmur right upper sternal border  Abdomen: Soft and nontender with adequate bowel sounds.  No organomegaly  Extremities: No cyanosis, clubbing or edema  Pulses: Pulses palpable and equal bilaterally  Skin: Warm and dry with no rash  Psych: Normal  Access sites and femoral triangles are normal.  No bleeding or hematomas noted     Results Review:     I reviewed the patient's new clinical results.  Results from last 7 days   Lab Units 06/28/24  0342 06/27/24  1051   SODIUM mmol/L 135* 136   POTASSIUM mmol/L 4.5 4.6   CHLORIDE mmol/L 100 103   CO2 mmol/L 24.0 22.0   BUN mg/dL 20 16   CREATININE mg/dL 0.96 0.87   GLUCOSE mg/dL 137* 128*   CALCIUM mg/dL 8.9 8.1*     Results from last 7 days   Lab Units 06/28/24  0342 06/27/24  1117 06/27/24  1051   WBC 10*3/mm3 21.92*  --  11.27*   HEMOGLOBIN g/dL 13.0  --  13.4   HEMOGLOBIN, POC g/dL  --  12.6  --    HEMATOCRIT % 40.0  --  39.9   HEMATOCRIT POC %  --  37*  --    PLATELETS " 10*3/mm3 269  --  238     Lab Results   Lab Value Date/Time    TROPONINT <0.010 02/26/2022 2332    TROPONINT <0.010 02/26/2022 2122             Results from last 7 days   Lab Units 06/27/24  1117   PH, ARTERIAL pH units 7.43           Medication Review: yes  Current Facility-Administered Medications   Medication Dose Route Frequency Provider Last Rate Last Admin    acetaminophen (TYLENOL) tablet 650 mg  650 mg Oral Q4H PRN Judy Mustafa, APRN        aspirin EC tablet 81 mg  81 mg Oral Daily Judy Mustafa, LIZ        atorvastatin (LIPITOR) tablet 40 mg  40 mg Oral Q PM Judy Mustafa, APRN   40 mg at 06/27/24 1622    atropine sulfate injection 0.5 mg  0.5 mg Intravenous Q5 Min PRN Judy Mustafa APRN        sennosides-docusate (PERICOLACE) 8.6-50 MG per tablet 2 tablet  2 tablet Oral BID PRN Judy Mustafa APRN        And    polyethylene glycol (MIRALAX) packet 17 g  17 g Oral Daily PRN Judy Mustafa APRJAZMYNE        And    bisacodyl (DULCOLAX) EC tablet 5 mg  5 mg Oral Daily PRN Judy Mustafa APRJAZMYNE        And    bisacodyl (DULCOLAX) suppository 10 mg  10 mg Rectal Daily PRN Judy Mustafa, APRJAZMYNE        Calcium Replacement - Follow Nurse / BPA Driven Protocol   Does not apply PRN Judy Mustafa, APRN        cetirizine (zyrTEC) tablet 10 mg  10 mg Oral Daily PRN Judy Mustafa, APRN        droperidol (INAPSINE) injection 0.625 mg  0.625 mg Intravenous Once PRN Sowmya Zaldivar DO        Or    droperidol (INAPSINE) injection 0.625 mg  0.625 mg Intramuscular Once PRN Sowmya Zaldivar,         fentaNYL citrate (PF) (SUBLIMAZE) injection 50 mcg  50 mcg Intravenous Q5 Min PRN Sowmya Zaldivar,         fluticasone (FLONASE) 50 MCG/ACT nasal spray 2 spray  2 spray Nasal Daily PRN Judy Mustafa, LIZ        hydrALAZINE (APRESOLINE) injection 10 mg  10 mg Intravenous Q6H PRN Judy Mustafa APRN        HYDROcodone-acetaminophen (NORCO) 7.5-325 MG per tablet 1 tablet  1 tablet Oral Q4H PRN Judy Mustafa, LIZ         HYDROmorphone (DILAUDID) injection 0.5 mg  0.5 mg Intravenous Q5 Min PRN Sowmya Zaldivar, DO   0.5 mg at 06/27/24 1134    lactated ringers bolus 500 mL  500 mL Intravenous Once PRN Sowmya Zaldivar E, DO        lactated ringers infusion  9 mL/hr Intravenous Continuous Sowmya Zaldivar, DO 9 mL/hr at 06/27/24 0924 Restarted at 06/27/24 1016    lisinopril (PRINIVIL,ZESTRIL) tablet 20 mg  20 mg Oral Q PM Judy Mustafa APRN        Magnesium Cardiology Dose Replacement - Follow Nurse / BPA Driven Protocol   Does not apply PRN Judy Mustafa APRN        morphine injection 2 mg  2 mg Intravenous Q3H PRN Judy Mustafa APRN        niCARdipine (CARDENE) 25mg in 250mL NS infusion  5-15 mg/hr Intravenous Titrated Miki Ramirez PA   Stopped at 06/27/24 1143    nitroglycerin (NITROSTAT) SL tablet 0.4 mg  0.4 mg Sublingual Q5 Min PRN Judy Mustafa APRN        ondansetron ODT (ZOFRAN-ODT) disintegrating tablet 4 mg  4 mg Oral Q6H PRN Judy Mustafa APRN        Or    ondansetron (ZOFRAN) injection 4 mg  4 mg Intravenous Q6H PRN Judy Mustafa APRN        oxymetazoline (AFRIN) nasal spray 2 spray  2 spray Nasal PRN Judy Mustafa APRN        phenylephrine (IDA-SYNEPHRINE) 50 mg in sodium chloride 0.9 % 250 mL infusion  0.5-3 mcg/kg/min Intravenous Titrated Trent Smith CRNA        Phosphorus Replacement - Follow Nurse / BPA Driven Protocol   Does not apply PRN Judy Mustafa APRN        Potassium Replacement - Follow Nurse / BPA Driven Protocol   Does not apply PRN Judy Mustafa APRN        sodium chloride 0.9 % infusion 250 mL  250 mL Intravenous Once PRN Judy Mustafa APRN         Facility-Administered Medications Ordered in Other Encounters   Medication Dose Route Frequency Provider Last Rate Last Admin    Chlorhexidine Gluconate Cloth 2 % pads 1 Application  1 Application Topical Q12H PRN Xander Hernandez PA             Aortic valve disorder    Severe aortic valve stenosis        Impression       FERDINAND 6/27/2024      Plan     Home today.  Will follow-up in 1 month in the office with echocardiography with me        Donny Schumacher MD   06/28/24  07:27 EDT

## 2024-06-28 NOTE — PROGRESS NOTES
"Freddy ELLIS Bottom  [id number]  1965     LOS: 1 day   Patient Care Team:  Jamal Turcios MD as PCP - General (Family Medicine)    Chief Complaint: Aortic valve stenosis      Subjective: No complaints, wants to go home    Objective:     Vital Sign Min/Max for last 24 hours  Temp  Min: 97.9 °F (36.6 °C)  Max: 98.5 °F (36.9 °C)   BP  Min: 97/64  Max: 135/89   Pulse  Min: 75  Max: 104   Resp  Min: 16  Max: 20   SpO2  Min: 89 %  Max: 99 %   No data recorded   Weight  Min: 122 kg (270 lb)  Max: 122 kg (270 lb)     Flowsheet Rows      Flowsheet Row First Filed Value   Admission Height 172.7 cm (68\") Documented at 06/27/2024 0707   Admission Weight 122 kg (270 lb) Documented at 06/27/2024 0707            Physical Exam:    Wound: Groin satisfactory    Pulses:     Mediastinal and Chest Tube Drainage:       Results Review:   Results from last 7 days   Lab Units 06/28/24  0342   WBC 10*3/mm3 21.92*   HEMOGLOBIN g/dL 13.0   HEMATOCRIT % 40.0   PLATELETS 10*3/mm3 269     Results from last 7 days   Lab Units 06/28/24  0342   SODIUM mmol/L 135*   POTASSIUM mmol/L 4.5   CHLORIDE mmol/L 100   CO2 mmol/L 24.0   BUN mg/dL 20   CREATININE mg/dL 0.96   GLUCOSE mg/dL 137*   CALCIUM mg/dL 8.9     Results from last 7 days   Lab Units 06/27/24  1117   PH, ARTERIAL pH units 7.43         Assessment      Aortic valve disorder    Severe aortic valve stenosis      Aortic valve stenosis.  Doing well after TAVR.  Discharge if all agree        Surjit Joshi MD  06/28/24  06:00 EDT      Please note that portions of this note were completed with a voice recognition program. Efforts were made to edit the dictations, but words may be mistranscribed  "

## 2024-07-01 ENCOUNTER — APPOINTMENT (OUTPATIENT)
Dept: CT IMAGING | Facility: HOSPITAL | Age: 59
End: 2024-07-01
Payer: COMMERCIAL

## 2024-07-01 ENCOUNTER — TELEPHONE (OUTPATIENT)
Dept: CARDIAC SURGERY | Facility: CLINIC | Age: 59
End: 2024-07-01
Payer: COMMERCIAL

## 2024-07-01 ENCOUNTER — APPOINTMENT (OUTPATIENT)
Dept: MRI IMAGING | Facility: HOSPITAL | Age: 59
End: 2024-07-01
Payer: COMMERCIAL

## 2024-07-01 ENCOUNTER — HOSPITAL ENCOUNTER (EMERGENCY)
Facility: HOSPITAL | Age: 59
Discharge: HOME | End: 2024-07-02
Attending: EMERGENCY MEDICINE
Payer: COMMERCIAL

## 2024-07-01 DIAGNOSIS — H53.9 VISUAL DISTURBANCE: Primary | ICD-10-CM

## 2024-07-01 LAB
ACT BLD: 409 SECONDS (ref 82–152)
ALBUMIN SERPL-MCNC: 4.3 G/DL (ref 3.5–5.2)
ALBUMIN/GLOB SERPL: 1.4 G/DL
ALP SERPL-CCNC: 98 U/L (ref 39–117)
ALT SERPL W P-5'-P-CCNC: 76 U/L (ref 1–41)
ANION GAP SERPL CALCULATED.3IONS-SCNC: 10 MMOL/L (ref 5–15)
AST SERPL-CCNC: 42 U/L (ref 1–40)
BASOPHILS # BLD AUTO: 0.08 10*3/MM3 (ref 0–0.2)
BASOPHILS NFR BLD AUTO: 0.6 % (ref 0–1.5)
BH BB BLOOD EXPIRATION DATE: NORMAL
BH BB BLOOD EXPIRATION DATE: NORMAL
BH BB BLOOD TYPE BARCODE: 6200
BH BB BLOOD TYPE BARCODE: 6200
BH BB DISPENSE STATUS: NORMAL
BH BB DISPENSE STATUS: NORMAL
BH BB PRODUCT CODE: NORMAL
BH BB PRODUCT CODE: NORMAL
BH BB UNIT NUMBER: NORMAL
BH BB UNIT NUMBER: NORMAL
BILIRUB SERPL-MCNC: 0.7 MG/DL (ref 0–1.2)
BUN SERPL-MCNC: 17 MG/DL (ref 8–23)
BUN/CREAT SERPL: 17.9 (ref 7–25)
CALCIUM SPEC-SCNC: 9.7 MG/DL (ref 8.6–10.5)
CHLORIDE SERPL-SCNC: 100 MMOL/L (ref 98–107)
CO2 SERPL-SCNC: 27 MMOL/L (ref 22–29)
CREAT BLDA-MCNC: 0.9 MG/DL (ref 0.6–1.3)
CREAT SERPL-MCNC: 0.95 MG/DL (ref 0.76–1.27)
CROSSMATCH INTERPRETATION: NORMAL
CROSSMATCH INTERPRETATION: NORMAL
DEPRECATED RDW RBC AUTO: 37.8 FL (ref 37–54)
EGFRCR SERPLBLD CKD-EPI 2021: 91.6 ML/MIN/1.73
EOSINOPHIL # BLD AUTO: 0.53 10*3/MM3 (ref 0–0.4)
EOSINOPHIL NFR BLD AUTO: 4.2 % (ref 0.3–6.2)
ERYTHROCYTE [DISTWIDTH] IN BLOOD BY AUTOMATED COUNT: 11.9 % (ref 12.3–15.4)
GLOBULIN UR ELPH-MCNC: 3.1 GM/DL
GLUCOSE SERPL-MCNC: 136 MG/DL (ref 65–99)
HCT VFR BLD AUTO: 41.5 % (ref 37.5–51)
HGB BLD-MCNC: 14.1 G/DL (ref 13–17.7)
IMM GRANULOCYTES # BLD AUTO: 0.18 10*3/MM3 (ref 0–0.05)
IMM GRANULOCYTES NFR BLD AUTO: 1.4 % (ref 0–0.5)
LYMPHOCYTES # BLD AUTO: 2.18 10*3/MM3 (ref 0.7–3.1)
LYMPHOCYTES NFR BLD AUTO: 17.2 % (ref 19.6–45.3)
MAGNESIUM SERPL-MCNC: 2.2 MG/DL (ref 1.6–2.4)
MCH RBC QN AUTO: 30.2 PG (ref 26.6–33)
MCHC RBC AUTO-ENTMCNC: 34 G/DL (ref 31.5–35.7)
MCV RBC AUTO: 88.9 FL (ref 79–97)
MONOCYTES # BLD AUTO: 0.93 10*3/MM3 (ref 0.1–0.9)
MONOCYTES NFR BLD AUTO: 7.4 % (ref 5–12)
NEUTROPHILS NFR BLD AUTO: 69.2 % (ref 42.7–76)
NEUTROPHILS NFR BLD AUTO: 8.74 10*3/MM3 (ref 1.7–7)
NRBC BLD AUTO-RTO: 0 /100 WBC (ref 0–0.2)
PLATELET # BLD AUTO: 275 10*3/MM3 (ref 140–450)
PMV BLD AUTO: 10.9 FL (ref 6–12)
POTASSIUM SERPL-SCNC: 4.6 MMOL/L (ref 3.5–5.2)
PROT SERPL-MCNC: 7.4 G/DL (ref 6–8.5)
RBC # BLD AUTO: 4.67 10*6/MM3 (ref 4.14–5.8)
SODIUM SERPL-SCNC: 137 MMOL/L (ref 136–145)
UNIT  ABO: NORMAL
UNIT  ABO: NORMAL
UNIT  RH: NORMAL
UNIT  RH: NORMAL
WBC NRBC COR # BLD AUTO: 12.64 10*3/MM3 (ref 3.4–10.8)

## 2024-07-01 PROCEDURE — 80053 COMPREHEN METABOLIC PANEL: CPT | Performed by: EMERGENCY MEDICINE

## 2024-07-01 PROCEDURE — 83735 ASSAY OF MAGNESIUM: CPT | Performed by: EMERGENCY MEDICINE

## 2024-07-01 PROCEDURE — 70498 CT ANGIOGRAPHY NECK: CPT

## 2024-07-01 PROCEDURE — 96374 THER/PROPH/DIAG INJ IV PUSH: CPT

## 2024-07-01 PROCEDURE — 25010000002 MIDAZOLAM PER 1 MG: Performed by: EMERGENCY MEDICINE

## 2024-07-01 PROCEDURE — 99285 EMERGENCY DEPT VISIT HI MDM: CPT

## 2024-07-01 PROCEDURE — 82565 ASSAY OF CREATININE: CPT

## 2024-07-01 PROCEDURE — 70496 CT ANGIOGRAPHY HEAD: CPT

## 2024-07-01 PROCEDURE — 70551 MRI BRAIN STEM W/O DYE: CPT

## 2024-07-01 PROCEDURE — 85025 COMPLETE CBC W/AUTO DIFF WBC: CPT | Performed by: EMERGENCY MEDICINE

## 2024-07-01 PROCEDURE — 25510000001 IOPAMIDOL PER 1 ML: Performed by: EMERGENCY MEDICINE

## 2024-07-01 PROCEDURE — 93005 ELECTROCARDIOGRAM TRACING: CPT | Performed by: EMERGENCY MEDICINE

## 2024-07-01 RX ORDER — MIDAZOLAM HYDROCHLORIDE 1 MG/ML
2 INJECTION INTRAMUSCULAR; INTRAVENOUS ONCE
Status: COMPLETED | OUTPATIENT
Start: 2024-07-01 | End: 2024-07-01

## 2024-07-01 RX ADMIN — IOPAMIDOL 75 ML: 755 INJECTION, SOLUTION INTRAVENOUS at 18:12

## 2024-07-01 RX ADMIN — MIDAZOLAM HYDROCHLORIDE 2 MG: 1 INJECTION, SOLUTION INTRAMUSCULAR; INTRAVENOUS at 21:36

## 2024-07-01 NOTE — DISCHARGE SUMMARY
Discharge Summary Transcatheter Valve Replacement    Date of Admission: 6/27/2024  Date of Discharge: 6/28/24    PCP: Jamal Turcios MD  ATTENDING: Surjit Joshi MD    Consults:   Consulting Physician(s)                     None                Structural Heart Team  1.  Surjit Joshi MD  2.  Abhijit Cabezas MD  3.  Tj Cota MD  4.  Yanelis Hernandez MD  5.  Ivelisse Cuadra MD  6.  Donny Schumacher MD  7.  LIZ Pittman    Presenting Problem/ HPI: The patient is a 59-year-old male who has been referred for evaluation of SAVR versus TAVR.  He has apparently met with Dr. Schumacher and discussed this and has chosen a TAVR.  He has been getting very short of breath with activity particularly going up inclines or stairs.  He is here for evaluation. He does have obstructive sleep apnea and is on a CPAP.     Discharge Diagnosis:     Aortic valve disorder    Severe aortic valve stenosis      Procedures Performed:   Procedure(s):  TRANSCATHETER AORTIC VALVE REPLACEMENT  TRANSESOPHAGEAL ECHOCARDIOGRAM WITH ANESTHESIA  Transfemoral Transcatheter Aortic Valve Replacement    Hospital Course:The patient is an 59 y.o. male from with symptoms of severe aortic stenosis as noted above in the HPI.  An echocardiogram revealed the following:  Results for orders placed during the hospital encounter of 06/27/24    Adult Transthoracic Echo Limited W/ Cont if Necessary Per Protocol    Interpretation Summary    Left ventricular systolic function is normal. Calculated left ventricular EF = 62.5%    Left ventricular wall thickness is consistent with mild concentric hypertrophy.    Aortic valve area is 1.6 cm2.    Peak velocity of the flow distal to the aortic valve is 269 cm/s. Aortic valve maximum pressure gradient is 29 mmHg. Aortic valve mean pressure gradient is 15 mmHg.    There is a TAVR valve present.    The aortic root measures 2.2 cm.      The patient was evaluated and deemed to be at greater risk of mortality  with traditional open AVR primarily based upon physical exam, laboratory studies, and imaging findings.   The patient was admitted the morning of the scheduled OR procedure.    Procedure:   #1 TAVR with Lopez MEGHAN #23 valve #2 completion right iliac and femoral angiogram    Physical Exam on date of discharge: Wound: Groin satisfactory     Pertinent Test Results:   Results from last 7 days   Lab Units 06/28/24  0342   WBC 10*3/mm3 21.92*   HEMOGLOBIN g/dL 13.0   HEMATOCRIT % 40.0   PLATELETS 10*3/mm3 269     Results from last 7 days   Lab Units 06/28/24  0342   SODIUM mmol/L 135*   POTASSIUM mmol/L 4.5   CHLORIDE mmol/L 100   CO2 mmol/L 24.0   BUN mg/dL 20   CREATININE mg/dL 0.96   GLUCOSE mg/dL 137*   CALCIUM mg/dL 8.9       Discharge Disposition  Home or Self Care    Discharge Medications     Discharge Medications        Continue These Medications        Instructions Start Date   aspirin 81 MG EC tablet   81 mg, Oral, Every Evening      atorvastatin 40 MG tablet  Commonly known as: LIPITOR   40 mg, Oral, Every Evening      cetirizine 10 MG tablet  Commonly known as: zyrTEC   10 mg, Oral, Daily PRN      fluticasone 50 MCG/ACT nasal spray  Commonly known as: FLONASE   2 sprays, Nasal, Daily PRN      lisinopril 20 MG tablet  Commonly known as: PRINIVIL,ZESTRIL   20 mg, Oral, Every Evening      oxymetazoline 0.05 % nasal spray  Commonly known as: AFRIN   2 sprays, Nasal, As Needed               Discharge Diet:     Activity at Discharge:   Activity Instructions       Bathing Restrictions      Showering is permitted. No tub baths, swimming pools, hot tubs until your surgeon approves.    Type of Restriction: Bathing    Bathing Restrictions: No Tub Bath    Driving Restrictions      Type of Restriction: Driving    Driving Restrictions: No Driving (Time Limited)    Length: 1 Week    Lifting Restrictions      Type of Restriction: Lifting    Lifting Restrictions: Other    Explain Lifing Restrictions: Do not lift anything  heavier than 10 pounds for 1 week (a gallon of milk weighs 8 pounds).    Other Activity Instructions      Walking is one of the best ways to get   stronger after your TAVR. Start with a 5  minute walk 3-4 times a day. Walk a little   more each day.     Climbing stairs at a slow pace is okay            Special Instructions Following Valve Procedure   Check incision/groin sites daily. Clean daily with a clean washcloth, soap and water. Pat dry. Do not scrub. Wear loose fitting clothing over groin incision site until healed.  Shower:  May shower daily, but no tub baths, hot tubs or pools until Cardiac (CT) Surgeon approves.   Walk daily starting with a 5 minute walk four times daily.  Increase walking by 1-2 minutes per walk as tolerated. No strenuous activity until CT Surgeon approves.   No lifting over 10 lbs for 7 days.  No driving for 7 days unless your physician tells you otherwise.   Heart valve infection: Tell your doctors/dentists that your heart valve has been replaced before any procedures or dental work. You will be given a special wallet card at discharge to show your doctor/dentist. The card provides recommendations on when antibiotics are needed and the dose.    Dental care: Reduce your risk of heart valve infection by brushing your teeth twice a day, using dental floss and seeing your dentist at least twice a year.   Monitor your heart rate, blood pressure and weight. Weigh yourself first thing in the morning wearing similar weight clothing. Report a weight gain of 3 pounds or more in a 24 hour period or 5 pounds in one week to your CT surgeon. Record your numbers and bring to your doctor appointments.  Report groin/incision site redness, drainage, swelling and/or significant tenderness, leg/feet swelling, chills and/or fever of 101 degrees at anytime or 100 for more than 24 hours, chest pain or increased shortness of breath to [facility] CT Surgery at [phone].   Call CT Surgery Office  for all questions or concerns at [phone].     Follow-up Appointments  Future Appointments   Date Time Provider Department Center   7/17/2024  1:00 PM [facility]ATION [facility]   7/24/2024  9:00 AM Jessica Schroeder APRN [facility]     Additional Instructions for the Follow-ups that You Need to Schedule       Call MD With Problems / Concerns   As directed      Monitor your heart rate, blood pressure,   and weight. Weigh yourself first thing in   the morning wearing similar weight   clothing. If you gain 3 pounds or more in   24 hours or 5 pounds or more in one   week, call your cardiac surgeon. Record   your numbers and bring to your doctor   appointments.   Call the cardiac surgery office for   groin/incision site redness, drainage,   swelling and/or significant tenderness,   leg swelling, chills and/or fever of 101   degrees or higher at any time or 100   degrees for more than 24 hours.    If you have any of the following   CALL 911- Don't Drive   ~Chest pain or trouble breathing  ~Sudden numbness or weakness in your   face, arms, or legs   ~Shortness of breath that doesn't get better   when you rest  ~Heart rate faster than 120 beats per minute   with shortness of breath  ~Heart rate lower than 50 beats per minute   or a new irregular heart rate  ~Bowel movement that is dark black or   bright red    Order Comments: Monitor your heart rate, blood pressure, and weight. Weigh yourself first thing in the morning wearing similar weight clothing. If you gain 3 pounds or more in 24 hours or 5 pounds or more in one week, call your cardiac surgeon. Record your numbers and bring to your doctor appointments. Call the cardiac surgery office for groin/incision site redness, drainage, swelling and/or significant tenderness, leg swelling, chills and/or fever of 101 degrees or higher at any time or 100 degrees for more than 24 hours.  If you have any of the following CALL 911- Don't Drive ~Chest pain or  trouble breathing ~Sudden numbness or weakness in your face, arms, or legs ~Shortness of breath that doesn't get better when you rest ~Heart rate faster than 120 beats per minute with shortness of breath ~Heart rate lower than 50 beats per minute or a new irregular heart rate ~Bowel movement that is dark black or bright red         Discharge Follow-up with Specialty: Cardiothoracic Surgery   As directed      Follow Up Details: Follow Up in 2-4 Weeks   Specialty: Cardiothoracic Surgery        Discharge Follow-up with Specialty: Heart & Valve Center; 1 Week   As directed      Specialty: [facility]ve [facility]   Follow Up: 1 Week   Follow Up Details: Follow Up With Heart & Valve Center Within 7 Days of Discharge. If Discharged on a Weekend, Schedule Follow Up For The Following Friday at 0900.        Cardiac Rehab Evaluation and Enrollment   Jul 03, 2024      Reason for Consult: Education                Time spent for discharge: 30 minutes    Thank you for allowing the [facility] Structural Heart Team to participate in your care.    If you have any questions or concerns please call:     Cardiothoracic Surgery   Dr. Joshi/Jocelynn/Cipriano at [phone]    Cardiology  Dr. Cuadra/David at [phone]    Dr. Schumacher at [phone]    Justina Singer PA-C  07/01/24  08:10 EDT

## 2024-07-01 NOTE — TELEPHONE ENCOUNTER
Mr. Schumacher called stating that he experienced vision loss in his right eye that lasted for about 3-5 minutes. He described a gray color over his field of view (not blurry as he was not able to make out any shape,size, or color)   He checked his blood pressure : 133/80's and pulse 75.  He denies numbness, confusion, difficulty speaking, or dizziness.     If patient had complete vision loss he will need to be evaluated in ER per LIZ Reid.

## 2024-07-02 VITALS
RESPIRATION RATE: 18 BRPM | SYSTOLIC BLOOD PRESSURE: 129 MMHG | HEART RATE: 74 BPM | WEIGHT: 270 LBS | DIASTOLIC BLOOD PRESSURE: 83 MMHG | OXYGEN SATURATION: 94 % | TEMPERATURE: 98.2 F | BODY MASS INDEX: 40.92 KG/M2 | HEIGHT: 68 IN

## 2024-07-02 LAB
QT INTERVAL: 346 MS
QTC INTERVAL: 399 MS

## 2024-07-02 NOTE — ED PROVIDER NOTES
"Subjective   History of Present Illness  59-year-old male sent to the emergency department by his CT surgeon, Dr. Joshi, to be evaluated for \"possible stroke.\"  Of note, the patient is 4 days postop following a TAVR.  He did well postoperatively and was discharged home on June 28.  I did a thorough review the patient's records, most notably his discharge summary and operative note as well as prior imaging.  He tells me that today at around 12:30 PM he was eating lunch when he had acute onset of painless vision loss in his right lower visual field in his right eye.  He denies any accompanying headache.  His symptoms lasted for approximately 3 to 5 minutes before resolving spontaneously.  He denies any accompanying weakness, numbness, or paresthesias.  No changes to his speech pattern.  He denies any sensation of a curtain closing or cobweb sensation.  No ocular pain.  His visual acuity is now back to baseline.  Given his symptoms, he contacted Dr. Joshi who advised him to come to the ED to be evaluated.  Currently, the patient feels well and has no complaints.      Review of Systems   Eyes:  Positive for visual disturbance.   All other systems reviewed and are negative.      Past Medical History:   Diagnosis Date    Aortic stenosis     Heart murmur     Hyperlipidemia     Hypertension     JHONNY on CPAP     Seasonal allergies        No Known Allergies    Past Surgical History:   Procedure Laterality Date    AORTIC VALVE REPAIR/REPLACEMENT N/A 6/27/2024    Procedure: TRANSCATHETER AORTIC VALVE REPLACEMENT;  Surgeon: Surjit Joshi MD;  Location:  [facility];  Service: Cardiothoracic;  Laterality: N/A;    AORTIC VALVE REPAIR/REPLACEMENT N/A 6/27/2024    Procedure: Transfemoral Transcatheter Aortic Valve Replacement;  Surgeon: Donny Schumacher MD;  Location:  [facility] OR;  Service: Cardiovascular;  Laterality: N/A;    CARDIAC CATHETERIZATION N/A 03/28/2024    Procedure: Left Heart Cath;  Surgeon: " Donny Schumacher MD;  Location: [facility] CATH INVASIVE LOCATION;  Service: Cardiology;  Laterality: N/A;    COLONOSCOPY      KNEE MENISCAL REPAIR Right     TRANSESOPHAGEAL ECHOCARDIOGRAM (CK) N/A 6/27/2024    Procedure: TRANSESOPHAGEAL ECHOCARDIOGRAM WITH ANESTHESIA;  Surgeon: Surjit Joshi MD;  Location: [facility];  Service: Cardiothoracic;  Laterality: N/A;       Family History   Problem Relation Age of Onset    Diabetes Mother     Coronary artery disease Father        Social History     Socioeconomic History    Marital status:     Number of children: 3   Tobacco Use    Smoking status: Never    Smokeless tobacco: Never   Vaping Use    Vaping status: Never Used   Substance and Sexual Activity    Alcohol use: Never    Drug use: Never    Sexual activity: Defer           Objective   Physical Exam  Vitals and nursing note reviewed.   Constitutional:       General: He is not in acute distress.     Appearance: Normal appearance. He is well-developed. He is not diaphoretic.      Comments: Well-appearing male in no acute distress   HENT:      Head: Normocephalic and atraumatic.   Eyes:      Pupils: Pupils are equal, round, and reactive to light.      Comments: Visual acuity is at baseline subjectively, no nystagmus, no pain with extraocular movements, no proptosis, no fixed/midposition pupil noted   Neck:      Vascular: No carotid bruit or JVD.   Cardiovascular:      Rate and Rhythm: Normal rate and regular rhythm.      Heart sounds: Normal heart sounds. No murmur heard.     No friction rub. No gallop.   Pulmonary:      Effort: Pulmonary effort is normal. No respiratory distress.      Breath sounds: Normal breath sounds. No wheezing or rales.   Abdominal:      General: Bowel sounds are normal. There is no distension.      Palpations: Abdomen is soft. There is no mass.      Tenderness: There is no abdominal tenderness. There is no guarding.   Musculoskeletal:         General: Normal range of motion.     "  Cervical back: Normal range of motion.   Skin:     General: Skin is warm and dry.      Coloration: Skin is not pale.      Findings: No erythema or rash.   Neurological:      Mental Status: He is alert and oriented to person, place, and time.      Comments: Awake, alert, and oriented x3, clear and fluent speech, no ataxia or dysmetria, normal gait, neurovascularly intact distally in all fours with bounding distal pulses and normal sensation, 5 out of 5 strength in all fours, no cranial nerve deficits noted with cranial nerves II through XII grossly intact   Psychiatric:         Mood and Affect: Mood normal.         Thought Content: Thought content normal.         Judgment: Judgment normal.         Procedures           ED Course  ED Course as of 07/02/24 0054 Mon Jul 01, 2024   1705 59-year-old male sent to the emergency department by his CT surgeon, Dr. Joshi, to be evaluated for \"possible stroke.\"  Of note, the patient is 4 days postop following a TAVR.  He did well postoperatively and was discharged home on June 28.  I did a thorough review of the patient's inpatient records.  He tells me that today at around 12:30 PM he was eating lunch when he had transient painless vision loss in his right lower visual field in his right eye.  His symptoms lasted for approximately 3 to 5 minutes before resolving spontaneously. [DD]   1718 As a result of his symptoms, he contacted Dr. Joshi who advised him to come to the ED to be evaluated.  On arrival, the patient is well-appearing.  He is completely asymptomatic.  NIH stroke scale of 0.  No bruits noted.  Broad differential diagnosis.  We will obtain labs and imaging, and we will reassess following initial interventions. [DD]   2323 Labs are bland/unrevealing. [DD]   2323 I personally and independently reviewed the patient's CT images and findings, and I am in agreement with the radiologist regarding CT interpretation--particularly there is no intracranial vascular " occlusion or emergent intracranial process noted. [DD]   Tue Jul 02, 2024 0052 I personally and independently reviewed the patient's MRI images and findings, and I am in agreement with the radiologist regarding MRI interpretation--particularly there is no acute stroke or emergent central process noted. [DD]   0052 Upon reevaluation, the patient looks and feels well.  He remains asymptomatic. [DD]   0053 Doubt emergent central process at this time.  He will follow-up with his primary care physician within the next week.  Agreeable with plan and given appropriate strict return precautions. [DD]   0054 Additionally, I have referred the patient to Dr. Machado of ophthalmology and he will follow-up as needed. [DD]      ED Course User Index  [DD] Marlo Otoole MD                                   Recent Results (from the past 24 hour(s))   Prepare RBC, 2 Units    Collection Time: 07/01/24  1:26 AM   Result Value Ref Range    Product Code Z9936I09     Unit Number [id number]     UNIT  ABO A     UNIT  RH POS     Crossmatch Interpretation Compatible     Dispense Status RE     Blood Expiration Date 202407232359     Blood Type Barcode 6200     Product Code R2011X77     Unit Number [id number]     UNIT  ABO A     UNIT  RH POS     Crossmatch Interpretation Compatible     Dispense Status RE     Blood Expiration Date 025038202620     Blood Type Barcode 6200    Comprehensive Metabolic Panel    Collection Time: 07/01/24  5:27 PM    Specimen: Blood   Result Value Ref Range    Glucose 136 (H) 65 - 99 mg/dL    BUN 17 8 - 23 mg/dL    Creatinine 0.95 0.76 - 1.27 mg/dL    Sodium 137 136 - 145 mmol/L    Potassium 4.6 3.5 - 5.2 mmol/L    Chloride 100 98 - 107 mmol/L    CO2 27.0 22.0 - 29.0 mmol/L    Calcium 9.7 8.6 - 10.5 mg/dL    Total Protein 7.4 6.0 - 8.5 g/dL    Albumin 4.3 3.5 - 5.2 g/dL    ALT (SGPT) 76 (H) 1 - 41 U/L    AST (SGOT) 42 (H) 1 - 40 U/L    Alkaline Phosphatase 98 39 - 117 U/L    Total Bilirubin 0.7 0.0 -  1.2 mg/dL    Globulin 3.1 gm/dL    A/G Ratio 1.4 g/dL    BUN/Creatinine Ratio 17.9 7.0 - 25.0    Anion Gap 10.0 5.0 - 15.0 mmol/L    eGFR 91.6 >60.0 mL/min/1.73   Magnesium    Collection Time: 07/01/24  5:27 PM    Specimen: Blood   Result Value Ref Range    Magnesium 2.2 1.6 - 2.4 mg/dL   CBC Auto Differential    Collection Time: 07/01/24  5:27 PM    Specimen: Blood   Result Value Ref Range    WBC 12.64 (H) 3.40 - 10.80 10*3/mm3    RBC 4.67 4.14 - 5.80 10*6/mm3    Hemoglobin 14.1 13.0 - 17.7 g/dL    Hematocrit 41.5 37.5 - 51.0 %    MCV 88.9 79.0 - 97.0 fL    MCH 30.2 26.6 - 33.0 pg    MCHC 34.0 31.5 - 35.7 g/dL    RDW 11.9 (L) 12.3 - 15.4 %    RDW-SD 37.8 37.0 - 54.0 fl    MPV 10.9 6.0 - 12.0 fL    Platelets 275 140 - 450 10*3/mm3    Neutrophil % 69.2 42.7 - 76.0 %    Lymphocyte % 17.2 (L) 19.6 - 45.3 %    Monocyte % 7.4 5.0 - 12.0 %    Eosinophil % 4.2 0.3 - 6.2 %    Basophil % 0.6 0.0 - 1.5 %    Immature Grans % 1.4 (H) 0.0 - 0.5 %    Neutrophils, Absolute 8.74 (H) 1.70 - 7.00 10*3/mm3    Lymphocytes, Absolute 2.18 0.70 - 3.10 10*3/mm3    Monocytes, Absolute 0.93 (H) 0.10 - 0.90 10*3/mm3    Eosinophils, Absolute 0.53 (H) 0.00 - 0.40 10*3/mm3    Basophils, Absolute 0.08 0.00 - 0.20 10*3/mm3    Immature Grans, Absolute 0.18 (H) 0.00 - 0.05 10*3/mm3    nRBC 0.0 0.0 - 0.2 /100 WBC   ECG 12 Lead Stroke Evaluation    Collection Time: 07/01/24  5:29 PM   Result Value Ref Range    QT Interval 346 ms    QTC Interval 399 ms   POC Creatinine    Collection Time: 07/01/24  5:34 PM    Specimen: Blood   Result Value Ref Range    Creatinine 0.90 0.60 - 1.30 mg/dL     Note: In addition to lab results from this visit, the labs listed above may include labs taken at another facility or during a different encounter within the last 24 hours. Please correlate lab times with ED admission and discharge times for further clarification of the services performed during this visit.    MRI Brain Without Contrast   Final Result   Impression:  "  Normal brain MRI examination.            Electronically Signed: Enrique Carnes MD     7/2/2024 12:45 AM EDT     Workstation ID: [id number]      CT Angiogram Head w AI Analysis of LVO   Final Result   Impression:   1. No evidence of large vessel occlusion within the intracranial vasculature. Right ophthalmic artery appears patent.   2. No evidence of hemodynamically significant stenosis within the vasculature of the neck.   3. Hypoplastic right transverse sinus, sigmoid sinus, and right internal jugular vein.            Electronically Signed: Keven Rodriguez     7/1/2024 7:01 PM EDT     Workstation ID: [id number]      CT Angiogram Neck   Final Result   Impression:   1. No evidence of large vessel occlusion within the intracranial vasculature. Right ophthalmic artery appears patent.   2. No evidence of hemodynamically significant stenosis within the vasculature of the neck.   3. Hypoplastic right transverse sinus, sigmoid sinus, and right internal jugular vein.            Electronically Signed: Keven Rodriguez     7/1/2024 7:01 PM EDT     Workstation ID: [id number]        Vitals:    07/01/24 1654   BP: 142/76   BP Location: Left arm   Patient Position: Sitting   Pulse: 81   Resp: 18   Temp: 98.2 °F (36.8 °C)   TempSrc: Oral   SpO2: 96%   Weight: 122 kg (270 lb)   Height: 172.7 cm (68\")     Medications   iopamidol (ISOVUE-370) 76 % injection 75 mL (75 mL Intravenous Given 7/1/24 1812)   midazolam (VERSED) injection 2 mg (2 mg Intravenous Given 7/1/24 2136)     ECG/EMG Results (last 24 hours)       Procedure Component Value Units Date/Time    ECG 12 Lead Stroke Evaluation [[id number]] Collected: 07/01/24 1729     Updated: 07/01/24 1732     QT Interval 346 ms      QTC Interval 399 ms     Narrative:      Test Reason : Stroke Evaluation  Blood Pressure :   */*   mmHG  Vent. Rate :  80 BPM     Atrial Rate :  80 BPM     P-R Int : 156 ms          QRS Dur :  84 ms      QT Int : 346 ms       P-R-T Axes :  51  13  10 degrees     QTc " Int : 399 ms    Normal sinus rhythm  Nonspecific T wave abnormality  Abnormal ECG  When compared with ECG of 28-JUN-2024 03:58,  No significant change was found    Referred By:            Confirmed By:           ECG 12 Lead Stroke Evaluation   Preliminary Result   Test Reason : Stroke Evaluation   Blood Pressure :   */*   mmHG   Vent. Rate :  80 BPM     Atrial Rate :  80 BPM      P-R Int : 156 ms          QRS Dur :  84 ms       QT Int : 346 ms       P-R-T Axes :  51  13  10 degrees      QTc Int : 399 ms      Normal sinus rhythm   Nonspecific T wave abnormality   Abnormal ECG   When compared with ECG of 28-JUN-2024 03:58,   No significant change was found      Referred By:            Confirmed By:                     Medical Decision Making  Amount and/or Complexity of Data Reviewed  Labs: ordered.  Radiology: ordered.  ECG/medicine tests: ordered.    Risk  Prescription drug management.        Final diagnoses:   Visual disturbance       ED Disposition  ED Disposition       ED Disposition   Discharge    Condition   Stable    Comment   --               Jamal Turcios MD  [address] [facility] [address] [phone]    In 1 week      Naomi Machado MD  [address]  [address]  [facility] [id number] [phone]      As needed         Medication List      No changes were made to your prescriptions during this visit.            Marlo Otoole MD  07/02/24 0054

## 2024-07-05 ENCOUNTER — DOCUMENTATION (OUTPATIENT)
Dept: CARDIOLOGY | Facility: HOSPITAL | Age: 59
End: 2024-07-05
Payer: COMMERCIAL

## 2024-07-05 NOTE — PROGRESS NOTES
Called patient to follow up after TAVR 6/27. Overall he reports feeling well, breathing and edema much improved. BP 120s/70-80s, HR WNL. No concerns today. ED visit on 7/1 for vision loss, workup unremarkable.     Reviewed upcoming apts with [facility], CT surgery on 7/24 and cardiac rehab 7/17. 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

## 2024-07-15 ENCOUNTER — TELEPHONE (OUTPATIENT)
Dept: CARDIAC REHAB | Facility: HOSPITAL | Age: 59
End: 2024-07-15
Payer: COMMERCIAL

## 2024-07-15 NOTE — TELEPHONE ENCOUNTER
Patient called to cancel Phase II Cardiac Rehab orientation. Patient states he returns to work on 7/17/24 and will be out of town. Patient states he will call to reschedule when he figures out work schedule.

## 2024-07-24 ENCOUNTER — OFFICE VISIT (OUTPATIENT)
Dept: CARDIAC SURGERY | Facility: CLINIC | Age: 59
End: 2024-07-24
Payer: COMMERCIAL

## 2024-07-24 VITALS
BODY MASS INDEX: 42.71 KG/M2 | DIASTOLIC BLOOD PRESSURE: 72 MMHG | OXYGEN SATURATION: 98 % | HEIGHT: 68 IN | SYSTOLIC BLOOD PRESSURE: 120 MMHG | TEMPERATURE: 97.1 F | HEART RATE: 74 BPM | WEIGHT: 281.8 LBS

## 2024-07-24 DIAGNOSIS — Z95.2 S/P TAVR (TRANSCATHETER AORTIC VALVE REPLACEMENT): Primary | ICD-10-CM

## 2024-07-24 NOTE — PROGRESS NOTES
[facility] Cardiothoracic Surgery Office Follow Up Note    Date of Encounter: 2024     Name: Freddy Schumacher  : 1965     Referred By: No ref. provider found  PCP: Jamal Turcios MD    Chief Complaint:    Chief Complaint   Patient presents with    Post-op Follow-up     Hospital follow-up s/p TAVR 24; severe aortic valve stenosis        Subjective      History of Present Illness:    It was nice to see Freddy Schumacher in follow up.  He is a pleasant 59 y.o. male with PMH significant for hypertension, hyperlipidemia on statin therapy, obstructive sleep apnea on CPAP, and aortic stenosis.      Patient is s/p transcatheter aortic valve replacement with Lopez Sapein #23 valve by Dr. Joshi on 2024.  See op report for full details.  Hospital course was uncomplicated.  On postoperative day #1 patient met required criteria and was discharged home.  On  patient presented to the emergency department with complaint of visual disturbance, reporting temporary vision loss in his right lower visual field.  Imaging was negative for acute stroke or emergent central process.  Patient was ultimately discharged home with referral to ophthalmology.  Mr. Schumacher presents today for initial postop follow up.  Patient denies fever or chills.  He reports improvement in his pre-operative shortness of breath.  He has not yet followed up with Cardiology but is scheduled.  Patient reports medication compliance.    Review of Systems:  Review of Systems   Constitutional: Negative for chills, decreased appetite, diaphoresis, fever, malaise/fatigue, night sweats, weight gain and weight loss.   HENT:  Negative for hoarse voice.    Eyes:  Positive for visual disturbance. Negative for blurred vision and double vision.   Cardiovascular:  Positive for dyspnea on exertion and leg swelling (ankle puffiness). Negative for chest pain, claudication, irregular heartbeat, near-syncope, orthopnea, palpitations, paroxysmal  nocturnal dyspnea and syncope.   Respiratory:  Negative for cough, hemoptysis, shortness of breath, sputum production and wheezing.    Hematologic/Lymphatic: Negative for adenopathy and bleeding problem. Does not bruise/bleed easily.   Skin:  Negative for color change, nail changes, poor wound healing and rash.   Musculoskeletal:  Negative for back pain, falls and muscle cramps.   Gastrointestinal:  Negative for abdominal pain, dysphagia and heartburn.   Genitourinary:  Negative for flank pain.   Neurological:  Positive for numbness. Negative for brief paralysis, disturbances in coordination, dizziness, focal weakness, headaches, light-headedness, loss of balance, paresthesias, sensory change, vertigo and weakness.   Psychiatric/Behavioral:  Negative for depression and suicidal ideas. The patient is not nervous/anxious.    Allergic/Immunologic: Negative for persistent infections.     I have reviewed the following portions of the patient's history: problem list, current medications, allergies, past surgical history, past medical history, past social history, past family history, and ROS and confirm it's accurate.    Allergies:  No Known Allergies    Medications:      Current Outpatient Medications:     aspirin 81 MG EC tablet, Take 1 tablet by mouth Every Evening., Disp: , Rfl:     atorvastatin (LIPITOR) 40 MG tablet, Take 1 tablet by mouth Every Evening., Disp: , Rfl:     cetirizine (zyrTEC) 10 MG tablet, Take 1 tablet by mouth Daily As Needed for Allergies or Rhinitis., Disp: , Rfl:     fluticasone (FLONASE) 50 MCG/ACT nasal spray, 2 sprays into the nostril(s) as directed by provider Daily As Needed for Rhinitis or Allergies., Disp: , Rfl:     lisinopril (PRINIVIL,ZESTRIL) 20 MG tablet, Take 1 tablet by mouth Every Evening., Disp: , Rfl:     oxymetazoline (AFRIN) 0.05 % nasal spray, 2 sprays into the nostril(s) as directed by provider As Needed for Congestion., Disp: , Rfl:   No current facility-administered  "medications for this visit.    History:   Past Medical History:   Diagnosis Date    Aortic stenosis     Heart murmur     Hyperlipidemia     Hypertension     JHONNY on CPAP     Seasonal allergies        Past Surgical History:   Procedure Laterality Date    AORTIC VALVE REPAIR/REPLACEMENT N/A 6/27/2024    Procedure: TRANSCATHETER AORTIC VALVE REPLACEMENT;  Surgeon: Surjit Joshi MD;  Location:  [facility];  Service: Cardiothoracic;  Laterality: N/A;    AORTIC VALVE REPAIR/REPLACEMENT N/A 6/27/2024    Procedure: Transfemoral Transcatheter Aortic Valve Replacement;  Surgeon: Donny Schumacher MD;  Location:  [facility];  Service: Cardiovascular;  Laterality: N/A;    CARDIAC CATHETERIZATION N/A 03/28/2024    Procedure: Left Heart Cath;  Surgeon: Donny Schumacher MD;  Location:  [facility];  Service: Cardiology;  Laterality: N/A;    COLONOSCOPY      KNEE MENISCAL REPAIR Right     TRANSESOPHAGEAL ECHOCARDIOGRAM (CK) N/A 6/27/2024    Procedure: TRANSESOPHAGEAL ECHOCARDIOGRAM WITH ANESTHESIA;  Surgeon: Surjit Joshi MD;  Location:  [facility];  Service: Cardiothoracic;  Laterality: N/A;       Social History     Socioeconomic History    Marital status:     Number of children: 3   Tobacco Use    Smoking status: Never    Smokeless tobacco: Never   Vaping Use    Vaping status: Never Used   Substance and Sexual Activity    Alcohol use: Never    Drug use: Never    Sexual activity: Defer        Family History   Problem Relation Age of Onset    Diabetes Mother     Coronary artery disease Father        Objective     Physical Exam:  Vitals:    07/24/24 0912   BP: 120/72   BP Location: Right arm   Patient Position: Sitting   Pulse: 74   Temp: 97.1 °F (36.2 °C)   SpO2: 98%   Weight: 128 kg (281 lb 12.8 oz)   Height: 172.7 cm (68\")  Comment: patient reported      Body mass index is 42.85 kg/m².    Physical Exam  Vitals reviewed.   Constitutional:       General: He is not in acute " distress.     Appearance: Normal appearance. He is not ill-appearing.   Eyes:      Pupils: Pupils are equal, round, and reactive to light.   Cardiovascular:      Rate and Rhythm: Normal rate and regular rhythm.      Pulses: Normal pulses.      Heart sounds: Normal heart sounds. No murmur heard.  Pulmonary:      Effort: Pulmonary effort is normal.      Breath sounds: Normal breath sounds.   Skin:     General: Skin is warm and dry.      Comments: Bilateral femoral access sites completely healed without underlying complication.    Neurological:      General: No focal deficit present.      Mental Status: He is alert and oriented to person, place, and time.   Psychiatric:         Mood and Affect: Mood normal.         Behavior: Behavior normal.         Thought Content: Thought content normal.         Judgment: Judgment normal.         Imaging/Labs:  XR Chest 2 View (07/24/2024 09:25)   Awaiting final report.     I personally reviewed this imaging.   Assessment / Plan    Assessment / Plan:  Aortic valve stenosis  S/p TAVR with Lopez Marlena #23 valve by Dr. Joshi on 6/27/2024.  Uncomplicated hospital course.    Deemed appropriate for discharge home on POD #1.   Presented to Er on 7/1 with complaint of visual disturbance, reporting temporary vision loss in his right lower visual field.    Imaging was negative for acute stroke or emergent central process.    Ultimately discharged home with referral to ophthalmology.  Presents today for initial postop follow up.   Denies fever or chills.   Reports improvement in pre-operative shortness of breath.   Bilateral femoral access sites completely healed without underlying complication.   Has not yet followed up with Cardiology, but is scheduled.   Reports medication compliance.       Follow Up:   We will follow on an as needed basis.   Patient encouraged to call the office with any questions or concerns.     Thank you for allowing me to participate in your care.  Best  Regards,    LIZ Jones  [facility] Cardiothoracic Surgery  07/24/24  09:14 EDT    I spent 23 minutes caring for Mr. Schumacher on this date of service. This time includes time spent by me in the following activities: preparing for the visit, reviewing tests, obtaining and/or reviewing a separately obtained history, performing a medically appropriate examination and/or evaluation, counseling and educating the patient/family/caregiver, ordering medications, tests, or procedures, referring and communicating with other health care professionals, documenting information in the medical record, independently interpreting results and communicating that information with the patient/family/caregiver, and care coordination.

## 2024-07-28 LAB — LV EF 2D ECHO EST: 55 %

## 2024-09-10 ENCOUNTER — DOCUMENTATION (OUTPATIENT)
Dept: CARDIOLOGY | Facility: HOSPITAL | Age: 59
End: 2024-09-10
Payer: COMMERCIAL

## 2025-05-01 ENCOUNTER — HOSPITAL ENCOUNTER (EMERGENCY)
Facility: HOSPITAL | Age: 60
Discharge: HOME | End: 2025-05-01
Attending: STUDENT IN AN ORGANIZED HEALTH CARE EDUCATION/TRAINING PROGRAM
Payer: COMMERCIAL

## 2025-05-01 VITALS
HEIGHT: 68 IN | DIASTOLIC BLOOD PRESSURE: 77 MMHG | RESPIRATION RATE: 18 BRPM | HEART RATE: 83 BPM | OXYGEN SATURATION: 95 % | BODY MASS INDEX: 41.68 KG/M2 | WEIGHT: 275 LBS | SYSTOLIC BLOOD PRESSURE: 123 MMHG | TEMPERATURE: 98.1 F

## 2025-05-01 DIAGNOSIS — R20.2 TINGLING SENSATION IN FACE: ICD-10-CM

## 2025-05-01 DIAGNOSIS — T63.441A ALLERGIC REACTION TO BEE STING: Primary | ICD-10-CM

## 2025-05-01 DIAGNOSIS — L50.9 URTICARIA: ICD-10-CM

## 2025-05-01 LAB
HOLD SPECIMEN: NORMAL
WHOLE BLOOD HOLD COAG: NORMAL
WHOLE BLOOD HOLD SPECIMEN: NORMAL

## 2025-05-01 PROCEDURE — 96375 TX/PRO/DX INJ NEW DRUG ADDON: CPT

## 2025-05-01 PROCEDURE — 25010000002 DEXAMETHASONE PER 1 MG: Performed by: STUDENT IN AN ORGANIZED HEALTH CARE EDUCATION/TRAINING PROGRAM

## 2025-05-01 PROCEDURE — 25010000002 FAMOTIDINE 10 MG/ML SOLUTION: Performed by: STUDENT IN AN ORGANIZED HEALTH CARE EDUCATION/TRAINING PROGRAM

## 2025-05-01 PROCEDURE — 96361 HYDRATE IV INFUSION ADD-ON: CPT

## 2025-05-01 PROCEDURE — 25810000003 LACTATED RINGERS SOLUTION: Performed by: STUDENT IN AN ORGANIZED HEALTH CARE EDUCATION/TRAINING PROGRAM

## 2025-05-01 PROCEDURE — 96374 THER/PROPH/DIAG INJ IV PUSH: CPT

## 2025-05-01 PROCEDURE — 99283 EMERGENCY DEPT VISIT LOW MDM: CPT

## 2025-05-01 RX ORDER — EPINEPHRINE 0.3 MG/.3ML
0.3 INJECTION SUBCUTANEOUS ONCE
Qty: 1 EACH | Refills: 0 | Status: SHIPPED | OUTPATIENT
Start: 2025-05-01 | End: 2025-05-01

## 2025-05-01 RX ORDER — SODIUM CHLORIDE 0.9 % (FLUSH) 0.9 %
10 SYRINGE (ML) INJECTION AS NEEDED
Status: DISCONTINUED | OUTPATIENT
Start: 2025-05-01 | End: 2025-05-02 | Stop reason: HOSPADM

## 2025-05-01 RX ORDER — DEXAMETHASONE SODIUM PHOSPHATE 10 MG/ML
10 INJECTION, SOLUTION INTRA-ARTICULAR; INTRALESIONAL; INTRAMUSCULAR; INTRAVENOUS; SOFT TISSUE ONCE
Status: COMPLETED | OUTPATIENT
Start: 2025-05-01 | End: 2025-05-01

## 2025-05-01 RX ORDER — FAMOTIDINE 10 MG/ML
20 INJECTION, SOLUTION INTRAVENOUS ONCE
Status: COMPLETED | OUTPATIENT
Start: 2025-05-01 | End: 2025-05-01

## 2025-05-01 RX ADMIN — SODIUM CHLORIDE, POTASSIUM CHLORIDE, SODIUM LACTATE AND CALCIUM CHLORIDE 1000 ML: 600; 310; 30; 20 INJECTION, SOLUTION INTRAVENOUS at 21:51

## 2025-05-01 RX ADMIN — FAMOTIDINE 20 MG: 10 INJECTION, SOLUTION INTRAVENOUS at 21:51

## 2025-05-01 RX ADMIN — DEXAMETHASONE SODIUM PHOSPHATE 10 MG: 10 INJECTION INTRAMUSCULAR; INTRAVENOUS at 21:51

## 2025-05-02 NOTE — ED PROVIDER NOTES
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  He started developing a rash and some inflammation around the stings felt like he could feel his throat tightening and tingling around his lips so came for evaluation he has never had prior allergic reaction to bee stings.  He took 50 of Benadryl prior to coming in and says he feels like his symptoms are improving.  He denies any nausea or vomiting.  Denies actual respiratory difficulty does not have any sensation of throat tightness or lip swelling at this time.  No other complaints at this time.       PAST MEDICAL HISTORY  Past Medical History:   Diagnosis Date    Aortic stenosis     Heart murmur     Hyperlipidemia     Hypertension     JHONNY on CPAP     Seasonal allergies          PAST SURGICAL HISTORY  Past Surgical History:   Procedure Laterality Date    AORTIC VALVE REPAIR/REPLACEMENT N/A 2024    Procedure: TRANSCATHETER AORTIC VALVE REPLACEMENT;  Surgeon: Surjit Joshi MD;  Location: [facility];  Service: Cardiothoracic;  Laterality: N/A;    AORTIC VALVE REPAIR/REPLACEMENT N/A 2024    Procedure: Transfemoral Transcatheter Aortic Valve Replacement;  Surgeon: Donny Schumacher MD;  Location:  [facility];  Service: Cardiovascular;  Laterality: N/A;    CARDIAC CATHETERIZATION N/A 2024    Procedure: Left Heart Cath;  Surgeon: Donny Schumacher MD;  Location:  [facility];  Service: Cardiology;  Laterality: N/A;    COLONOSCOPY       KNEE MENISCAL REPAIR Right     TRANSESOPHAGEAL ECHOCARDIOGRAM (CK) N/A 6/27/2024    Procedure: TRANSESOPHAGEAL ECHOCARDIOGRAM WITH ANESTHESIA;  Surgeon: Surjit Joshi MD;  Location: [facility] OR;  Service: Cardiothoracic;  Laterality: N/A;         FAMILY HISTORY  Family History   Problem Relation Age of Onset    Diabetes Mother     Coronary artery disease Father          SOCIAL HISTORY  Social History     Socioeconomic History    Marital status:     Number of children: 3   Tobacco Use    Smoking status: Never    Smokeless tobacco: Never   Vaping Use    Vaping status: Never Used   Substance and Sexual Activity    Alcohol use: Never    Drug use: Never    Sexual activity: Defer         ALLERGIES  Patient has no known allergies.        REVIEW OF SYSTEMS  Review of Systems       All systems reviewed and negative except for those discussed in HPI.       PHYSICAL EXAM    I have reviewed the triage vital signs and nursing notes.    ED Triage Vitals [05/01/25 2055]   Temp Heart Rate Resp BP SpO2   98.1 °F (36.7 °C) 97 18 138/89 95 %      Temp src Heart Rate Source Patient Position BP Location FiO2 (%)   Oral Monitor Lying Left arm --       Physical Exam  GENERAL:   Appears in no acute distress  HENT: Nares patent.  Oropharynx is clear  EYES: No scleral icterus.  CV: Regular rhythm, regular rate.  RESPIRATORY: Normal effort.  No audible wheezes, rales or rhonchi.  ABDOMEN: Soft, nontender  MUSCULOSKELETAL: No deformities.   NEURO: Alert, moves all extremities, follows commands.  SKIN: Warm, dry, scant urticarial rash around the bee stings on the arm and face      LAB RESULTS  Recent Results (from the past 24 hours)   Green Top (Gel)    Collection Time: 05/01/25  9:10 PM   Result Value Ref Range    Extra Tube Hold for add-ons.    Lavender Top    Collection Time: 05/01/25  9:10 PM   Result Value Ref Range    Extra Tube hold for add-on    Gold Top - SST    Collection Time: 05/01/25  9:10 PM   Result Value  Ref Range    Extra Tube Hold for add-ons.    Gray Top    Collection Time: 05/01/25  9:10 PM   Result Value Ref Range    Extra Tube Hold for add-ons.    Light Blue Top    Collection Time: 05/01/25  9:10 PM   Result Value Ref Range    Extra Tube Hold for add-ons.        If labs were ordered, I independently reviewed the results and considered them in treating the patient.        RADIOLOGY  No Radiology Exams Resulted Within Past 24 Hours    I ordered and independently reviewed the above noted radiographic studies.        See radiologist's dictation for official interpretation.        PROCEDURES    Procedures    No orders to display       MEDICATIONS GIVEN IN ER    Medications   sodium chloride 0.9 % flush 10 mL (has no administration in time range)   lactated ringers bolus 1,000 mL (0 mL Intravenous Stopped 5/1/25 2257)   famotidine (PEPCID) injection 20 mg (20 mg Intravenous Given 5/1/25 2151)   dexAMETHasone (DECADRON) injection 10 mg (10 mg Intravenous Given 5/1/25 2151)         MEDICAL DECISION MAKING, PROGRESS, and CONSULTS    All labs, if obtained, have been independently reviewed by me.  All radiology studies, if obtained, have been reviewed by me and the radiologist dictating the report.  All EKGs, if obtained, have been independently viewed and interpreted by me/my attending physician.      Discussion below represents my analysis of pertinent findings related to patient's condition, differential diagnosis, treatment plan and final disposition.                                          Differential diagnosis:    Anaphylaxis, allergic reaction, urticaria, angioedema      Additional sources:    - Discussed/ obtained information from independent historians:      - External (non-ED) record review: Outpatient cardiothoracic notes showing history of:    Aortic stenosis       Heart murmur      Hyperlipidemia      Hypertension      JHONNY on CPAP      Seasonal allergies        - Chronic or social conditions impacting  care: Hypertension, hyperlipidemia        Orders placed during this visit:  Orders Placed This Encounter   Procedures    [place] Draw    Insert Peripheral IV    Green Top (Gel)    Lavender Top    Gold Top - SST    Gray Top    Light Blue Top         Additional orders considered but not ordered:      ED Course:    Consultants:      ED Course as of 05/01/25 5279   Thu May 01, 2025   2256 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  He started developing a rash and some inflammation around the stings felt like he could feel his throat tightening and tingling around his lips so came for evaluation he has never had prior allergic reaction to bee stings.  He took 50 of Benadryl prior to coming in and says he feels like his symptoms are improving.  He denies any nausea or vomiting.  Denies actual respiratory difficulty does not have any sensation of throat tightness or lip swelling at this time.  No other complaints at this time. [CC]   2257 He arrived awake and alert he still has urticarial rash at the location of the stings but no other symptoms at this time I think epinephrine can be deferred but I have added on famotidine and dexamethasone as well as IV fluids.  He is agreeable to monitoring in the emergency department after 2 hours of monitoring he says his symptoms have resolved completely.  We discussed the possibility of recurrence of symptoms and writing him for an EpiPen.  I have also instructed that he can go ahead and take more Benadryl when he gets home.  He is interested in following with an allergist and I have given a referral.  Counseled on return precautions verbally expressed understanding of these. [CC]      ED Course User Index  [CC] Mauro Melendrez MD              Shared Decision Making:  After my consideration of clinical presentation and any  laboratory/radiology studies obtained, I discussed the findings with the patient/patient representative who is in agreement with the treatment plan and the final disposition.   Risks and benefits of discharge and/or observation/admission were discussed.       AS OF 23:53 EDT VITALS:    BP - 123/77  HR - 83  TEMP - 98.1 °F (36.7 °C) (Oral)  O2 SATS - 95%                  DIAGNOSIS  Final diagnoses:   Allergic reaction to bee sting   Urticaria   Tingling sensation in face         DISPOSITION  DISCHARGE    Patient discharged in stable condition.    Reviewed implications of results, diagnosis, meds, responsibility to follow up, warning signs and symptoms of possible worsening, potential complications and reasons to return to ER.    Patient/Family voiced understanding of above instructions.    Discussed plan for discharge, as there is no emergent indication for admission.  Pt/family is agreeable and understands need for follow up and possible repeat testing.  Pt/family is aware that discharge does not mean that nothing is wrong but that it indicates no emergency is currently present that requires admission and they must continue care with follow-up as given below or with a physician of their choice.     FOLLOW-UP  Inés Neville MD  [address] [place] [address] [place] [id number] [phone]    Call   If you would like allergy/immunology follow-up         Medication List        New Prescriptions      EPINEPHrine 0.3 MG/0.3ML solution auto-injector injection  Commonly known as: EPIPEN  Inject 0.3 mL under the skin into the appropriate area as directed 1 (One) Time for 1 dose.               Where to Get Your Medications        [id number] - [place], [place] - [address] [facility] [phone] [place] [phone]   [address] [facility] [address]      Phone: [phone]   EPINEPHrine 0.3 MG/0.3ML solution auto-injector injection             Please note that  portions of this document were completed with voice recognition software.        Mauro Melendrez MD  05/01/25 4339

## 2025-05-02 NOTE — DISCHARGE INSTRUCTIONS
Fill the prescription for the provided EpiPen if you ever have a sting that results in difficulty breathing diffuse rash or any other concerning symptoms you should use it right away.  You can continue taking Benadryl like you did before you came here today.  While today's workup was reassuring if symptoms change or worsen please return to the ED or seek other medical care.

## 2025-08-15 ENCOUNTER — DOCUMENTATION (OUTPATIENT)
Dept: CARDIOLOGY | Facility: HOSPITAL | Age: 60
End: 2025-08-15
Payer: COMMERCIAL

## (undated) DEVICE — NDL PERC 1PRT THNWALL W/BASEPLT 18G 7CM

## (undated) DEVICE — CLTH CLENS READYCLEANSE PERI CARE PK/5

## (undated) DEVICE — GW INQWIRE FC PTFE STD J/1.5 .035 260

## (undated) DEVICE — GW SAFARI2 PRESH XSM CRV .035IN 3.2X2.9X275CM

## (undated) DEVICE — ST ACC MICROPUNCTURE .018 TRANSLSS/SS/TP 5F/10CM 21G/7CM

## (undated) DEVICE — 3M™ IOBAN™ 2 ANTIMICROBIAL INCISE DRAPE 6651EZ: Brand: IOBAN™ 2

## (undated) DEVICE — SUT SILK 3/0 TIES 18IN A184H

## (undated) DEVICE — BLANKT WARM UNDER/BDY A/ 100X195CM DISP LF

## (undated) DEVICE — CATH DIAG EXPO .045 FL3  5F 100CM

## (undated) DEVICE — CATH GUIDE BERN 5F 65CM

## (undated) DEVICE — GW CERBRL FC PTFE STD/STR .035 260CM

## (undated) DEVICE — LEX TAVR: Brand: MEDLINE INDUSTRIES, INC.

## (undated) DEVICE — PK ATS CUST W CARDIOTOMY RESEVOIR

## (undated) DEVICE — SUT SILK 0 SH 30IN K834H

## (undated) DEVICE — LN INJ CONTRST FLXCIL HP F/M LL 1200PSI48

## (undated) DEVICE — PERCLOSE™ PROSTYLE™ SUTURE-MEDIATED CLOSURE AND REPAIR SYSTEM: Brand: PERCLOSE™ PROSTYLE™

## (undated) DEVICE — SUT MONOCRYL PLS ANTIB UND 3/0  PS1 27IN

## (undated) DEVICE — PK CATH CARD 10

## (undated) DEVICE — TRAP FLD MINIVAC MEGADYNE 100ML

## (undated) DEVICE — PAD,ARMBOARD,CONV,FOAM,2X8X20",12PR/CS: Brand: MEDLINE

## (undated) DEVICE — CATH DIAG EXPO .056 AL1 6F 100CM

## (undated) DEVICE — RADIFOCUS GLIDEWIRE: Brand: GLIDEWIRE

## (undated) DEVICE — BOWL UTIL STRL 32OZ

## (undated) DEVICE — CABL PACE ATRIAL PT BLU

## (undated) DEVICE — COVER,TABLE,HVY DUTY,60"X90",STRL: Brand: MEDLINE

## (undated) DEVICE — ANTIBACTERIAL UNDYED BRAIDED (POLYGLACTIN 910), SYNTHETIC ABSORBABLE SUTURE: Brand: COATED VICRYL

## (undated) DEVICE — PINNACLE INTRODUCER SHEATH: Brand: PINNACLE

## (undated) DEVICE — COVER,MAYO STAND,XL,STERILE: Brand: MEDLINE

## (undated) DEVICE — GW AMPLTZ SUPERSTIFF STR .035IN 180CM

## (undated) DEVICE — SYR LUERLOK 50ML

## (undated) DEVICE — ADULT, W/LG. BACK PAD, RADIOTRANSPARENT ELEMENT AND LEAD WIRE COMPATIBLE W/: Brand: DEFIBRILLATION ELECTRODES

## (undated) DEVICE — ELECTRD DEFIB M/FUNC PROPADZ STRL 2PK

## (undated) DEVICE — INTRO SHEATH PRELUDE IDEAL SPRNG COIL .021 6F 16X80CM

## (undated) DEVICE — ELECTRD BLD EZ CLN STD 2.5IN

## (undated) DEVICE — SHEET, DRAPE, SPLIT, STERILE: Brand: MEDLINE

## (undated) DEVICE — 3M™ STERI-DRAPE™ FLUOROSCOPE DRAPE, 10 PER CARTON / 4 CARTONS PER CASE, 1012: Brand: STERI-DRAPE™

## (undated) DEVICE — DECANTER BAG 9": Brand: MEDLINE INDUSTRIES, INC.

## (undated) DEVICE — SUT SILK 4/0 TIES 18IN A183H

## (undated) DEVICE — LHK- LEFT HEART KIT BAPTIST: Brand: MEDLINE INDUSTRIES, INC.

## (undated) DEVICE — SI AVANTI+ 7F STD W/GW  NO OBT: Brand: AVANTI

## (undated) DEVICE — INTENDED FOR TISSUE SEPARATION, AND OTHER PROCEDURES THAT REQUIRE A SHARP SURGICAL BLADE TO PUNCTURE OR CUT.: Brand: BARD-PARKER ® STAINLESS STEEL BLADES

## (undated) DEVICE — PENCL ROCKRSWCH MEGADYNE W/HOLSTR 10FT SS

## (undated) DEVICE — ANGIOGRAPHIC CATHETER: Brand: EXPO™

## (undated) DEVICE — GLV SURG PREMIERPRO MIC LTX PF SZ6.5 BRN

## (undated) DEVICE — SUCTION CANISTER 2500CC: Brand: DEROYAL

## (undated) DEVICE — SENSR CERBRL O2 PK/2

## (undated) DEVICE — GW PERIPH VASC ADX J/TP SS .035 150CM 3MM

## (undated) DEVICE — DRAPE,TOP,102X53,STERILE: Brand: MEDLINE

## (undated) DEVICE — ADHS SKIN PREMIERPRO EXOFIN TOPICAL HI/VISC .5ML

## (undated) DEVICE — TR BAND RADIAL ARTERY COMPRESSION DEVICE: Brand: TR BAND

## (undated) DEVICE — GLIDEX™ COATED HYDROPHILIC GUIDEWIRE: Brand: MAGIC TORQUE™

## (undated) DEVICE — SYR LUERLOK 30CC

## (undated) DEVICE — PROVIDES A STERILE INTERFACE BETWEEN THE OPERATING ROOM SURGICAL LAMPS (NON-STERILE) AND THE SURGEON OR NURSE (STERILE).: Brand: STERION®CLAMP COVER FABRIC

## (undated) DEVICE — PENCL SMOKE/EVAC MEGADYNE TELESCP 10FT

## (undated) DEVICE — SUT SILK 2/0 TIES 18IN A185H

## (undated) DEVICE — SUT PROLN 4/0 BB D/A 36IN 8581H

## (undated) DEVICE — HDRST POSTIN FM CRDL TRACH SLOT NONCOMRESS 9X8X4IN

## (undated) DEVICE — CATH DIAG EXPO .045 PIG 5F 110CM

## (undated) DEVICE — PATIENT RETURN ELECTRODE, SINGLE-USE, CONTACT QUALITY MONITORING, ADULT, WITH 9FT CORD, FOR PATIENTS WEIGING OVER 33LBS. (15KG): Brand: MEGADYNE

## (undated) DEVICE — SUT PROLN 6/0 C1 D/A 30IN 8706H

## (undated) DEVICE — CATH PACE PACEL BIPOL 5F110CM

## (undated) DEVICE — APPL CHLORAPREP TINTED 26ML TEAL

## (undated) DEVICE — BOOT,SUTURE,STANDARD,YELLOW-IN-BLUE: Brand: MEDLINE

## (undated) DEVICE — PCH INST SURG INVISISHIELD 2PCKT